# Patient Record
Sex: FEMALE | Race: OTHER | HISPANIC OR LATINO | Employment: OTHER | URBAN - METROPOLITAN AREA
[De-identification: names, ages, dates, MRNs, and addresses within clinical notes are randomized per-mention and may not be internally consistent; named-entity substitution may affect disease eponyms.]

---

## 2018-05-08 LAB
ABSOL LYMPHOCYTES (HISTORICAL): 2.5 K/UL (ref 0.5–4)
ALBUMIN SERPL BCP-MCNC: 3.8 G/DL (ref 3–5.2)
ALP SERPL-CCNC: 76 U/L (ref 43–122)
ALT SERPL W P-5'-P-CCNC: 20 U/L (ref 9–52)
ANION GAP SERPL CALCULATED.3IONS-SCNC: 10 MMOL/L (ref 5–14)
AST SERPL W P-5'-P-CCNC: 17 U/L (ref 14–36)
BANDS (HISTORICAL): 3 % (ref 3–11)
BILIRUB SERPL-MCNC: 0.3 MG/DL
BUN SERPL-MCNC: 16 MG/DL (ref 5–25)
CALCIUM SERPL-MCNC: 9.7 MG/DL (ref 8.4–10.2)
CHLORIDE SERPL-SCNC: 106 MEQ/L (ref 97–108)
CHOLEST SERPL-MCNC: 174 MG/DL
CHOLEST/HDLC SERPL: 3.5 {RATIO}
CO2 SERPL-SCNC: 25 MMOL/L (ref 22–30)
COMMENT (HISTORICAL): ABNORMAL
CREATINE, SERUM (HISTORICAL): 0.69 MG/DL (ref 0.6–1.2)
DEPRECATED RDW RBC AUTO: 14.9 %
EGFR (HISTORICAL): >60 ML/MIN/1.73 M2
EOSINOPHIL # BLD AUTO: 0.2 K/UL (ref 0–0.4)
EOSINOPHIL NFR BLD AUTO: 2 % (ref 0–6)
GLUCOSE SERPL-MCNC: 113 MG/DL (ref 70–99)
HCT VFR BLD AUTO: 31.3 % (ref 36–46)
HDLC SERPL-MCNC: 50 MG/DL
HGB BLD-MCNC: 10.3 G/DL (ref 12–16)
IRON SERPL-MCNC: 68 UG/DL (ref 37–170)
LDL/HDL RATIO (HISTORICAL): 1.9
LDLC SERPL CALC-MCNC: 94 MG/DL
LYMPHOCYTES NFR BLD AUTO: 27 % (ref 25–45)
MCH RBC QN AUTO: 30.7 PG (ref 26–34)
MCHC RBC AUTO-ENTMCNC: 32.9 % (ref 31–36)
MCV RBC AUTO: 93 FL (ref 80–100)
MONOCYTES # BLD AUTO: 1.6 K/UL (ref 0.2–0.9)
MONOCYTES NFR BLD AUTO: 17 % (ref 1–10)
NEUTROPHILS ABS COUNT (HISTORICAL): 4.9 K/UL (ref 1.8–7.8)
NEUTS SEG NFR BLD AUTO: 51 % (ref 45–65)
PERCENT SATURATION (HISTORICAL): 24 % (ref 11–46)
PLATELET # BLD AUTO: 306 K/MCL (ref 150–450)
POTASSIUM SERPL-SCNC: 4.7 MEQ/L (ref 3.6–5)
RBC # BLD AUTO: 3.36 M/MCL (ref 4–5.2)
RBC MORPHOLOGY (HISTORICAL): ABNORMAL
SODIUM SERPL-SCNC: 141 MEQ/L (ref 137–147)
T4 FREE SERPL-MCNC: 1.58 NG/DL (ref 0.78–2.19)
TIBC SERPL-MCNC: 287 UG/DL (ref 265–497)
TOTAL PROTEIN (HISTORICAL): 6.7 G/DL (ref 5.9–8.4)
TRIGL SERPL-MCNC: 152 MG/DL
TSH SERPL DL<=0.05 MIU/L-ACNC: 0.07 UIU/ML (ref 0.47–4.68)
VITAMIN D25-HYDROXY (HISTORICAL): 18.7 NG/ML (ref 30–100)
VLDLC SERPL CALC-MCNC: 30 MG/DL (ref 0–40)
WBC # BLD AUTO: 9.2 K/MCL (ref 4.5–11)

## 2018-05-09 LAB
EST. AVERAGE GLUCOSE BLD GHB EST-MCNC: 163 MG/DL
HBA1C MFR BLD HPLC: 7.3 %
HEPATITIS A IGM ANTIBODY (HISTORICAL): NORMAL
HEPATITIS B CORE IGM ANTIBODY (HISTORICAL): NORMAL
HEPATITIS B SURFACE AG CONFIRMATION (HISTORICAL): NORMAL
HEPATITIS C ANTIBODY (HISTORICAL): NORMAL

## 2018-06-26 DIAGNOSIS — Z79.4 TYPE 2 DIABETES MELLITUS WITHOUT COMPLICATION, WITH LONG-TERM CURRENT USE OF INSULIN (HCC): Primary | ICD-10-CM

## 2018-06-26 DIAGNOSIS — E11.9 TYPE 2 DIABETES MELLITUS WITHOUT COMPLICATION, WITH LONG-TERM CURRENT USE OF INSULIN (HCC): Primary | ICD-10-CM

## 2018-06-27 DIAGNOSIS — IMO0001 UNCONTROLLED DIABETES MELLITUS TYPE 2 WITHOUT COMPLICATIONS, UNSPECIFIED WHETHER LONG TERM INSULIN USE: Primary | ICD-10-CM

## 2018-06-27 RX ORDER — CARVEDILOL 3.12 MG/1
1 TABLET ORAL 2 TIMES DAILY
Refills: 3 | COMMUNITY
Start: 2018-05-18 | End: 2018-08-15 | Stop reason: ALTCHOICE

## 2018-06-27 RX ORDER — LANCETS 28 GAUGE
EACH MISCELLANEOUS
Qty: 100 EACH | Refills: 0 | Status: SHIPPED | OUTPATIENT
Start: 2018-06-27 | End: 2018-11-19 | Stop reason: SDUPTHER

## 2018-06-27 RX ORDER — BLOOD-GLUCOSE METER
1 KIT MISCELLANEOUS 2 TIMES DAILY
Qty: 1 EACH | Refills: 0 | Status: SHIPPED | OUTPATIENT
Start: 2018-06-27 | End: 2019-03-07 | Stop reason: ALTCHOICE

## 2018-06-27 RX ORDER — OMEPRAZOLE 20 MG/1
CAPSULE, DELAYED RELEASE ORAL
COMMUNITY
Start: 2018-02-13 | End: 2018-07-10 | Stop reason: SDUPTHER

## 2018-06-27 RX ORDER — SENNOSIDES 8.6 MG
CAPSULE ORAL
COMMUNITY
Start: 2017-05-24 | End: 2019-08-29 | Stop reason: ALTCHOICE

## 2018-06-27 RX ORDER — VALSARTAN 160 MG/1
1 TABLET ORAL DAILY
Refills: 5 | COMMUNITY
Start: 2018-05-21 | End: 2018-08-15 | Stop reason: ALTCHOICE

## 2018-07-10 ENCOUNTER — OFFICE VISIT (OUTPATIENT)
Dept: FAMILY MEDICINE CLINIC | Facility: CLINIC | Age: 83
End: 2018-07-10
Payer: MEDICARE

## 2018-07-10 VITALS
HEART RATE: 82 BPM | TEMPERATURE: 98 F | WEIGHT: 174 LBS | HEIGHT: 63 IN | RESPIRATION RATE: 16 BRPM | SYSTOLIC BLOOD PRESSURE: 130 MMHG | OXYGEN SATURATION: 97 % | BODY MASS INDEX: 30.83 KG/M2 | DIASTOLIC BLOOD PRESSURE: 60 MMHG

## 2018-07-10 DIAGNOSIS — M54.42 CHRONIC LEFT-SIDED LOW BACK PAIN WITH LEFT-SIDED SCIATICA: ICD-10-CM

## 2018-07-10 DIAGNOSIS — E05.00 THYROTOXICOSIS WITH DIFFUSE GOITER AND WITHOUT THYROID STORM: ICD-10-CM

## 2018-07-10 DIAGNOSIS — E11.9 TYPE 2 DIABETES MELLITUS WITHOUT COMPLICATION, WITHOUT LONG-TERM CURRENT USE OF INSULIN (HCC): ICD-10-CM

## 2018-07-10 DIAGNOSIS — G89.29 CHRONIC LEFT-SIDED LOW BACK PAIN WITH LEFT-SIDED SCIATICA: ICD-10-CM

## 2018-07-10 DIAGNOSIS — K29.30 CHRONIC SUPERFICIAL GASTRITIS WITHOUT BLEEDING: Primary | ICD-10-CM

## 2018-07-10 PROCEDURE — 99214 OFFICE O/P EST MOD 30 MIN: CPT | Performed by: FAMILY MEDICINE

## 2018-07-10 RX ORDER — OMEPRAZOLE 20 MG/1
20 CAPSULE, DELAYED RELEASE ORAL DAILY
Qty: 30 CAPSULE | Refills: 2 | Status: SHIPPED | OUTPATIENT
Start: 2018-07-10 | End: 2018-08-15 | Stop reason: ALTCHOICE

## 2018-07-10 RX ORDER — METHIMAZOLE 5 MG/1
5 TABLET ORAL 3 TIMES DAILY
Qty: 30 TABLET | Refills: 5 | Status: SHIPPED | OUTPATIENT
Start: 2018-07-10 | End: 2018-11-09 | Stop reason: SDUPTHER

## 2018-07-10 RX ORDER — LIDOCAINE 50 MG/G
OINTMENT TOPICAL AS NEEDED
Qty: 240 G | Refills: 3 | Status: SHIPPED | OUTPATIENT
Start: 2018-07-10 | End: 2019-03-07 | Stop reason: ALTCHOICE

## 2018-07-10 RX ORDER — BLOOD-GLUCOSE METER
KIT MISCELLANEOUS
Refills: 0 | COMMUNITY
Start: 2018-06-27 | End: 2019-03-07 | Stop reason: ALTCHOICE

## 2018-07-10 NOTE — PROGRESS NOTES
Assessment/Plan:    Diabetes mellitus ([facility] [address] )  Lab Results   Component Value Date    HGBA1C 7 3 (H) 05/08/2018       Blood sugars in good control will continue same  Thyrotoxicosis  She has hyperthyroidism, I asked her to start methimazole 5 mg once a day, repeat the labs in three months       Diagnoses and all orders for this visit:    Chronic superficial gastritis without bleeding  -     omeprazole (PriLOSEC) 20 mg delayed release capsule; Take 1 capsule (20 mg total) by mouth daily for 30 days    Chronic left-sided low back pain with left-sided sciatica  -     lidocaine (XYLOCAINE) 5 % ointment; Apply topically as needed for mild pain    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  -     methimazole (TAPAZOLE) 5 mg tablet; Take 1 tablet (5 mg total) by mouth 3 (three) times a day for 30 days    Type 2 diabetes mellitus without complication, without long-term current use of insulin ([facility])    Other orders  -     Blood Glucose Monitoring Suppl (FREESTYLE LITE) MARVIN; USE BID          Subjective:      Patient ID: Kelly Chand is a 80 y o  female  Pt c/o increased left lower back pain  The last two labs of TSH were in the hyperthyroidism range  Abdominal Pain   Pertinent negatives include no fever, headaches, hematuria, nausea or vomiting  Hypertension   Pertinent negatives include no chest pain, headaches, palpitations or shortness of breath  Back Pain   Associated symptoms include abdominal pain  Pertinent negatives include no chest pain, fever or headaches  The following portions of the patient's history were reviewed and updated as appropriate: allergies, current medications, past family history, past medical history, past social history, past surgical history and problem list     Review of Systems   Constitutional: Positive for unexpected weight change  Negative for fatigue and fever  HENT: Negative for sore throat and trouble swallowing      Eyes: Negative for photophobia  Respiratory: Negative for cough, chest tightness, shortness of breath and wheezing  Cardiovascular: Negative for chest pain, palpitations and leg swelling  Gastrointestinal: Positive for abdominal pain  Negative for blood in stool, nausea and vomiting  Genitourinary: Negative for hematuria  Musculoskeletal: Positive for back pain (Pain is in the left sacroiliac joint)  Neurological: Negative for dizziness, syncope, light-headedness and headaches  Hematological: Does not bruise/bleed easily  Objective:      /60 (BP Location: Left arm, Patient Position: Sitting, Cuff Size: Standard)   Pulse 82   Temp 98 °F (36 7 °C) (Oral)   Resp 16   Ht 5' 3" (1 6 m)   Wt 78 9 kg (174 lb)   SpO2 97%   Breastfeeding? No   BMI 30 82 kg/m²          Physical Exam   Constitutional: She is oriented to person, place, and time  She appears well-developed and well-nourished  HENT:   Head: Normocephalic  Eyes: Pupils are equal, round, and reactive to light  Neck: Normal range of motion  Cardiovascular: Normal rate and regular rhythm  Pulmonary/Chest: Effort normal and breath sounds normal    Abdominal: Soft  Bowel sounds are normal    Musculoskeletal: She exhibits tenderness ( Tenderness is in the left sacroiliacjoint)  Neurological: She is alert and oriented to person, place, and time  She has normal reflexes  Skin: Skin is warm and dry  Psychiatric: She has a normal mood and affect   Her behavior is normal  Judgment and thought content normal

## 2018-07-10 NOTE — ASSESSMENT & PLAN NOTE
Lab Results   Component Value Date    HGBA1C 7 3 (H) 05/08/2018       Blood sugars in good control will continue same

## 2018-07-10 NOTE — ASSESSMENT & PLAN NOTE
She has hyperthyroidism, I asked her to start methimazole 5 mg once a day, repeat the labs in three months

## 2018-07-20 ENCOUNTER — DOCUMENTATION (OUTPATIENT)
Dept: FAMILY MEDICINE CLINIC | Facility: CLINIC | Age: 83
End: 2018-07-20

## 2018-07-20 ENCOUNTER — TELEPHONE (OUTPATIENT)
Dept: FAMILY MEDICINE CLINIC | Facility: CLINIC | Age: 83
End: 2018-07-20

## 2018-07-20 DIAGNOSIS — I10 ESSENTIAL HYPERTENSION, BENIGN: Primary | ICD-10-CM

## 2018-07-20 RX ORDER — LOSARTAN POTASSIUM 50 MG/1
25 TABLET ORAL DAILY
Qty: 30 TABLET | Refills: 2 | Status: SHIPPED | OUTPATIENT
Start: 2018-07-20 | End: 2018-08-22 | Stop reason: SDUPTHER

## 2018-07-20 NOTE — TELEPHONE ENCOUNTER
Pt called stating that pharmacy called her about valsartan  She needs a replacement for this medication  [facility] to [facility]  [address]

## 2018-08-06 ENCOUNTER — TELEPHONE (OUTPATIENT)
Dept: FAMILY MEDICINE CLINIC | Facility: CLINIC | Age: 83
End: 2018-08-06

## 2018-08-06 NOTE — TELEPHONE ENCOUNTER
Flower from [facility] called stating that patient is having knee pain and she will like to know if Dr Carrie Sousa can send a script for Lidocaine patches for the patient  She states that patient is using OTC cream and it is not helping

## 2018-08-08 DIAGNOSIS — M79.604 PAIN IN BOTH LOWER EXTREMITIES: Primary | ICD-10-CM

## 2018-08-08 DIAGNOSIS — M79.605 PAIN IN BOTH LOWER EXTREMITIES: Primary | ICD-10-CM

## 2018-08-08 RX ORDER — LIDOCAINE 50 MG/G
1 PATCH TOPICAL DAILY
Qty: 30 PATCH | Refills: 0 | Status: SHIPPED | OUTPATIENT
Start: 2018-08-08 | End: 2018-09-01 | Stop reason: SDUPTHER

## 2018-08-15 ENCOUNTER — OFFICE VISIT (OUTPATIENT)
Dept: FAMILY MEDICINE CLINIC | Facility: CLINIC | Age: 83
End: 2018-08-15
Payer: MEDICARE

## 2018-08-15 VITALS
HEART RATE: 80 BPM | BODY MASS INDEX: 30.48 KG/M2 | DIASTOLIC BLOOD PRESSURE: 60 MMHG | OXYGEN SATURATION: 97 % | RESPIRATION RATE: 16 BRPM | SYSTOLIC BLOOD PRESSURE: 120 MMHG | TEMPERATURE: 98.1 F | HEIGHT: 63 IN | WEIGHT: 172 LBS

## 2018-08-15 DIAGNOSIS — R00.1 BRADYCARDIA: ICD-10-CM

## 2018-08-15 DIAGNOSIS — I10 BENIGN ESSENTIAL HYPERTENSION: Primary | ICD-10-CM

## 2018-08-15 PROCEDURE — 99213 OFFICE O/P EST LOW 20 MIN: CPT | Performed by: FAMILY MEDICINE

## 2018-08-15 NOTE — PROGRESS NOTES
Assessment/Plan:    Benign essential hypertension  Patient is here for physical exam we find this visit without any distress or abnormalities  We  recommended exercise at least three and 0 5 hours per week and healthy diet with a low carbohydrate and low saturated fat  Began to follow him up in one year for his regular physical exam      Bradycardia  His bradycardia was secondary to carvedilol, I request patient to stop this medication  The risk of getting complete block are age is high, and the blood pressure is under control  She does not have cardiac issues  Diagnoses and all orders for this visit:    Benign essential hypertension    Bradycardia          Subjective:      Patient ID: Marisela Geiger is a 80 y o  female  PT here post home nurse visit  Home nurse at home on Monday, found patient's heart rate to be 46  Pt asymptomatic  Pt called to office on Monday but pt did not want to come until today  The following portions of the patient's history were reviewed and updated as appropriate: allergies, current medications, past family history, past medical history, past social history, past surgical history and problem list     Review of Systems   Constitutional: Positive for unexpected weight change  Negative for fatigue and fever  HENT: Negative for sore throat and trouble swallowing  Eyes: Negative for photophobia  Respiratory: Negative for cough, chest tightness, shortness of breath and wheezing  Cardiovascular: Negative for chest pain, palpitations and leg swelling  Gastrointestinal: Negative for abdominal pain, blood in stool, nausea and vomiting  Genitourinary: Negative for hematuria  Neurological: Negative for dizziness, syncope, light-headedness and headaches  Hematological: Does not bruise/bleed easily           Objective:      /60 (BP Location: Left arm, Patient Position: Sitting, Cuff Size: Standard)   Pulse 80   Temp 98 1 °F (36 7 °C) (Oral)   Resp 16 Ht 5' 3" (1 6 m)   Wt 78 kg (172 lb)   SpO2 97%   Breastfeeding? No   BMI 30 47 kg/m²          Physical Exam   Constitutional: She is oriented to person, place, and time  She appears well-developed and well-nourished  Obese looking  HENT:   Head: Normocephalic  Eyes: EOM are normal  Pupils are equal, round, and reactive to light  Neck: Neck supple  Cardiovascular: Normal rate and regular rhythm  Pulmonary/Chest: Effort normal and breath sounds normal    Abdominal: Soft  Bowel sounds are normal    Musculoskeletal: Normal range of motion  She exhibits tenderness  Neurological: She is alert and oriented to person, place, and time  She has normal reflexes  Skin: Skin is warm and dry  Psychiatric: She has a normal mood and affect   Her behavior is normal  Judgment and thought content normal

## 2018-08-15 NOTE — PATIENT INSTRUCTIONS
Bradicardia   LO QUE NECESITA SABER:  Esta relacionado con el uso de Carvedilol  La bradicardia es mulu frecuencia cardíaca de menos de 60 latidos por minuto  Un frecuencia cardíaca lenta es normal para [employer], lynette los deportistas, y no necesita tratamiento  La bradicardia también podría ser causada por afecciones de david que necesitan tratamiento  MIENTRAS USTED ESTÁ AQUÍ:   Consentimiento informado  es un documento legal que explica los exámenes, tratamientos, o procedimientos que usted podría necesitar  Un consentimiento informado significa que usted comprende que es lo que se va a realizar y que puede ana decisiones sobre lo que usted desee  Usted da groves permiso al firmar el formulario de consentimiento  Puede designar a otra persona para que firme patricia formulario por usted si usted no puede hacerlo  Usted tiene el derecho de comprender groves cuidado médico en términos o palabras que usted pueda entender  Antes de firmar el documento de consentimiento, entienda los riesgos y beneficios de lo que se le hará  Asegúrese que todas saurav preguntas anne-marie contestadas  Naaman  intravenosa  es un tubo pequeño que se coloca en la vena y se [place] para administrar medicamentos o líquidos  Oxígeno:  Es posible que necesite oxígeno adicional si el nivel de oxígeno en groves rick se encuentra por debajo de donde debería estar  Es posible que le administren oxígeno a través de mulu máscarilla colocada sobre la nariz y la boca o a través de pequeños tubos colocados en las fosas nasales  Medicamentos:   · Los medicamentos para el corazón  Se pueden administrar para aumentar la frecuencia cardíaca  Estos medicamentos pueden administrarse mediante mulu vía intravenosa  · Otros medicamentos  se pueden administrar para cualquier afección médica que le está causando groves bradicardia  Estos problemas de david pueden incluir mulu infección, o un desequilibrio en groves nivel de azúcar en la rick, electrolítico o de la tiroides  Hable con groves médico sobre [employer]  Exámenes:   · Los análisis de rick:  se pueden realizar para determinar si tiene algún daño al corazón  También se pueden utilizar para encontrar la causa de groves bradicardia  · Un electrocardiograma  registra la actividad del corazón  Se puede utilizar para comprobar el daño al corazón debido a un ataque al corazón o por problemas con la forma lynette los signos Marcelina Mitten a través de groves corazón  Monitoreo:   · Signos vitales  incluye la revisión de la frecuencia cardíaca, la presión arterial, la frecuencia respiratoria y la temperatura corporal  Saurav signos vitales serán revisados varias veces mientras usted esté en el hospital      · Un oxímetro de pulso  revisa el nivel de oxígeno en groves rick  Es posible que grvoes cuerpo no reciba suficiente oxígeno si groves corazón bombea demasiado lento  Un cable con mulu capucha o gancho al final o mulu cinta adhesiva será colocado en groves dedo, oreja o dedo cate del pie  La otra extremidad del cordón se sujeta a la máquina  · Un monitor cardíaco se le conoce lynette telemetría, se utilizará para registrar groves frecuencia y ritmo cardíaco mientras esté en el hospital   [facility] [place]:   · Un marcapasos temporal  es un tratamiento a corto plazo en el hospital  El marcapasos se adhiere a groves piel con conductores adhesivos o es insertado en el interior de mulu vena de groves carson o pecho  Un pequeño dispositivo de estimulación ayuda a mantener estables saurav latidos cardíacos  · Un marcapaso permanente  se implanta quirúrgicamente debajo de groves piel en groves pecho o abdomen  Minor Matters diminuta genera unos impulsos eléctricos que mantienen groves frecuencia cardíaca regular  RIESGOS:   Usted puede desmayarse cuando groves frecuencia cardíaca se le baja demasiado  Usted podría presentar convulsiones, presión arterial dereck, dolor en el pecho o insuficiencia cardíaca  Groves corazón podría dejar de latir     ACUERDOS SOBRE GROVES CUIDADO:   Usted tiene el derecho de ayudar a planear fletcher cuidado  Aprenda todo lo que pueda sobre fletcher condición y lynette darle tratamiento  Discuta saurav opciones de tratamiento con saurav médicos para decidir el cuidado que usted desea recibir  Usted siempre tiene el derecho de rechazar el tratamiento  © 2017 [address] Information is for End User's use only and may not be sold, redistributed or otherwise used for commercial purposes  All illustrations and images included in CareNotes® are the copyrighted property of A D A M , Inc  or Man Kidd  Esta información es sólo para uso en educación  Fletcher intención no es darle un consejo médico sobre enfermedades o tratamientos  Colsulte con fletcher Star Selina farmacéutico antes de seguir cualquier régimen médico para saber si es seguro y efectivo para usted

## 2018-08-16 PROBLEM — R00.1 BRADYCARDIA: Status: ACTIVE | Noted: 2018-08-16

## 2018-08-16 NOTE — ASSESSMENT & PLAN NOTE
His bradycardia was secondary to carvedilol, I request patient to stop this medication  The risk of getting complete block are age is high, and the blood pressure is under control  She does not have cardiac issues

## 2018-08-16 NOTE — ASSESSMENT & PLAN NOTE
Patient is here for physical exam we find this visit without any distress or abnormalities  We  recommended exercise at least three and 0 5 hours per week and healthy diet with a low carbohydrate and low saturated fat    Began to follow him up in one year for his regular physical exam

## 2018-08-22 DIAGNOSIS — I10 ESSENTIAL HYPERTENSION, BENIGN: ICD-10-CM

## 2018-08-22 RX ORDER — LOSARTAN POTASSIUM 50 MG/1
50 TABLET ORAL DAILY
Qty: 30 TABLET | Refills: 5 | Status: SHIPPED | OUTPATIENT
Start: 2018-08-22 | End: 2018-09-21 | Stop reason: SDUPTHER

## 2018-08-29 DIAGNOSIS — M79.605 PAIN IN BOTH LOWER EXTREMITIES: ICD-10-CM

## 2018-08-29 DIAGNOSIS — M79.604 PAIN IN BOTH LOWER EXTREMITIES: ICD-10-CM

## 2018-08-29 NOTE — TELEPHONE ENCOUNTER
Visiting nurse called stating the patient has been using 1 on each knee daily  Would like the script to be sent to the pharmacy with the directions specifying that

## 2018-09-01 RX ORDER — LIDOCAINE 50 MG/G
PATCH TOPICAL
Qty: 60 PATCH | Refills: 0 | Status: SHIPPED | OUTPATIENT
Start: 2018-09-01 | End: 2019-03-07 | Stop reason: ALTCHOICE

## 2018-09-21 DIAGNOSIS — I10 ESSENTIAL HYPERTENSION, BENIGN: ICD-10-CM

## 2018-09-24 RX ORDER — LOSARTAN POTASSIUM 50 MG/1
TABLET ORAL
Qty: 90 TABLET | Refills: 5 | Status: SHIPPED | OUTPATIENT
Start: 2018-09-24 | End: 2019-02-25 | Stop reason: SDUPTHER

## 2018-10-11 DIAGNOSIS — R10.13 EPIGASTRIC PAIN: Primary | ICD-10-CM

## 2018-10-11 RX ORDER — OMEPRAZOLE 20 MG/1
20 CAPSULE, DELAYED RELEASE ORAL DAILY
Qty: 30 CAPSULE | Refills: 2 | OUTPATIENT
Start: 2018-10-11

## 2018-10-11 RX ORDER — FAMOTIDINE 40 MG/1
40 TABLET, FILM COATED ORAL 2 TIMES DAILY PRN
Qty: 60 TABLET | Refills: 5 | Status: SHIPPED | OUTPATIENT
Start: 2018-10-11 | End: 2018-11-15 | Stop reason: ALTCHOICE

## 2018-11-09 RX ORDER — METHIMAZOLE 5 MG/1
TABLET ORAL
Refills: 5 | COMMUNITY
Start: 2018-10-07 | End: 2019-03-07 | Stop reason: ALTCHOICE

## 2018-11-09 RX ORDER — OMEPRAZOLE 20 MG/1
CAPSULE, DELAYED RELEASE ORAL
Refills: 0 | COMMUNITY
Start: 2018-09-03 | End: 2018-11-15 | Stop reason: SDUPTHER

## 2018-11-15 ENCOUNTER — OFFICE VISIT (OUTPATIENT)
Dept: FAMILY MEDICINE CLINIC | Facility: CLINIC | Age: 83
End: 2018-11-15
Payer: MEDICARE

## 2018-11-15 ENCOUNTER — APPOINTMENT (OUTPATIENT)
Dept: LAB | Facility: HOSPITAL | Age: 83
End: 2018-11-15
Payer: MEDICARE

## 2018-11-15 VITALS
HEIGHT: 63 IN | TEMPERATURE: 98 F | DIASTOLIC BLOOD PRESSURE: 70 MMHG | SYSTOLIC BLOOD PRESSURE: 150 MMHG | OXYGEN SATURATION: 98 % | HEART RATE: 92 BPM | WEIGHT: 173 LBS | BODY MASS INDEX: 30.65 KG/M2 | RESPIRATION RATE: 16 BRPM

## 2018-11-15 DIAGNOSIS — E11.9 TYPE 2 DIABETES MELLITUS WITHOUT COMPLICATION, WITHOUT LONG-TERM CURRENT USE OF INSULIN (HCC): Primary | ICD-10-CM

## 2018-11-15 DIAGNOSIS — K29.30 SUPERFICIAL GASTRITIS WITHOUT HEMORRHAGE, UNSPECIFIED CHRONICITY: ICD-10-CM

## 2018-11-15 DIAGNOSIS — I10 BENIGN ESSENTIAL HYPERTENSION: ICD-10-CM

## 2018-11-15 DIAGNOSIS — E03.9 HYPOTHYROIDISM (ACQUIRED): Primary | ICD-10-CM

## 2018-11-15 DIAGNOSIS — E11.9 TYPE 2 DIABETES MELLITUS WITHOUT COMPLICATION, WITHOUT LONG-TERM CURRENT USE OF INSULIN (HCC): ICD-10-CM

## 2018-11-15 DIAGNOSIS — E03.9 HYPOTHYROIDISM (ACQUIRED): ICD-10-CM

## 2018-11-15 DIAGNOSIS — Z23 NEEDS FLU SHOT: ICD-10-CM

## 2018-11-15 LAB
ALBUMIN SERPL BCP-MCNC: 4.2 G/DL (ref 3–5.2)
ALP SERPL-CCNC: 72 U/L (ref 43–122)
ALT SERPL W P-5'-P-CCNC: 14 U/L (ref 9–52)
ANION GAP SERPL CALCULATED.3IONS-SCNC: 8 MMOL/L (ref 5–14)
ANISOCYTOSIS BLD QL SMEAR: PRESENT
AST SERPL W P-5'-P-CCNC: 20 U/L (ref 14–36)
BILIRUB SERPL-MCNC: 0.3 MG/DL
BUN SERPL-MCNC: 20 MG/DL (ref 5–25)
CALCIUM SERPL-MCNC: 9.5 MG/DL (ref 8.4–10.2)
CHLORIDE SERPL-SCNC: 105 MMOL/L (ref 97–108)
CHOLEST SERPL-MCNC: 148 MG/DL
CO2 SERPL-SCNC: 28 MMOL/L (ref 22–30)
CREAT SERPL-MCNC: 0.7 MG/DL (ref 0.6–1.2)
EOSINOPHIL # BLD AUTO: 0.05 THOUSAND/UL (ref 0–0.4)
EOSINOPHIL NFR BLD MANUAL: 1 % (ref 0–6)
ERYTHROCYTE [DISTWIDTH] IN BLOOD BY AUTOMATED COUNT: 15 %
GFR SERPL CREATININE-BSD FRML MDRD: 77 ML/MIN/1.73SQ M
GLUCOSE SERPL-MCNC: 166 MG/DL (ref 70–99)
HCT VFR BLD AUTO: 31.1 % (ref 36–46)
HDLC SERPL-MCNC: 53 MG/DL (ref 40–59)
HGB BLD-MCNC: 10.4 G/DL (ref 12–16)
HYPERCHROMIA BLD QL SMEAR: PRESENT
LDLC SERPL CALC-MCNC: 69 MG/DL
LYMPHOCYTES # BLD AUTO: 1.46 THOUSAND/UL (ref 0.5–4)
LYMPHOCYTES # BLD AUTO: 27 % (ref 20–50)
MCH RBC QN AUTO: 30.6 PG (ref 26.8–34.3)
MCHC RBC AUTO-ENTMCNC: 33.3 G/DL (ref 31.4–37.4)
MCV RBC AUTO: 92 FL (ref 82–98)
MONOCYTES # BLD AUTO: 0.97 THOUSAND/UL (ref 0.2–0.9)
MONOCYTES NFR BLD AUTO: 18 % (ref 1–10)
NEUTS BAND NFR BLD MANUAL: 2 % (ref 0–8)
NEUTS SEG # BLD: 2.92 THOUSAND/UL (ref 1.8–7.8)
NEUTS SEG NFR BLD AUTO: 52 %
PLATELET # BLD AUTO: 242 THOUSANDS/UL (ref 150–450)
PLATELET BLD QL SMEAR: ADEQUATE
PMV BLD AUTO: 9.1 FL (ref 8.9–12.7)
POIKILOCYTOSIS BLD QL SMEAR: PRESENT
POTASSIUM SERPL-SCNC: 4 MMOL/L (ref 3.6–5)
PROT SERPL-MCNC: 7.2 G/DL (ref 5.9–8.4)
RBC # BLD AUTO: 3.38 MILLION/UL (ref 4–5.2)
RBC MORPH BLD: ABNORMAL
SODIUM SERPL-SCNC: 141 MMOL/L (ref 137–147)
TOTAL CELLS COUNTED SPEC: 100
TRIGL SERPL-MCNC: 130 MG/DL
TSH SERPL DL<=0.05 MIU/L-ACNC: 1.29 UIU/ML (ref 0.47–4.68)
WBC # BLD AUTO: 5.4 THOUSAND/UL (ref 4.31–10.16)

## 2018-11-15 PROCEDURE — 99214 OFFICE O/P EST MOD 30 MIN: CPT | Performed by: FAMILY MEDICINE

## 2018-11-15 PROCEDURE — 80053 COMPREHEN METABOLIC PANEL: CPT

## 2018-11-15 PROCEDURE — 36415 COLL VENOUS BLD VENIPUNCTURE: CPT

## 2018-11-15 PROCEDURE — 80061 LIPID PANEL: CPT

## 2018-11-15 PROCEDURE — 84443 ASSAY THYROID STIM HORMONE: CPT

## 2018-11-15 PROCEDURE — 82043 UR ALBUMIN QUANTITATIVE: CPT | Performed by: FAMILY MEDICINE

## 2018-11-15 PROCEDURE — G0008 ADMIN INFLUENZA VIRUS VAC: HCPCS | Performed by: FAMILY MEDICINE

## 2018-11-15 PROCEDURE — 85007 BL SMEAR W/DIFF WBC COUNT: CPT

## 2018-11-15 PROCEDURE — 83036 HEMOGLOBIN GLYCOSYLATED A1C: CPT

## 2018-11-15 PROCEDURE — 85027 COMPLETE CBC AUTOMATED: CPT

## 2018-11-15 PROCEDURE — 82570 ASSAY OF URINE CREATININE: CPT | Performed by: FAMILY MEDICINE

## 2018-11-15 PROCEDURE — 90662 IIV NO PRSV INCREASED AG IM: CPT | Performed by: FAMILY MEDICINE

## 2018-11-15 RX ORDER — OMEPRAZOLE 20 MG/1
20 CAPSULE, DELAYED RELEASE ORAL DAILY
Qty: 30 CAPSULE | Refills: 5 | Status: SHIPPED | OUTPATIENT
Start: 2018-11-15 | End: 2019-03-07 | Stop reason: ALTCHOICE

## 2018-11-15 NOTE — PATIENT INSTRUCTIONS
Hipertensión crónica   CUIDADO AMBULATORIO:   Hipertensión  es la presión arterial dereck  La presión arterial es la fuerza que ejerce la rick contra las wolfe de las arterias  La presión arterial normal debería estar a menos de 120/80  La pre-hipertensión estaría entre 120/80 y 139/ 80  La presión arterial dereck estaría a 140/90 o más dereck  La hipertensión causa que groves presión arterial se eleve tanto que groves corazón se ve forzado a trabajar [employer] de lo normal  [place] puede dañar groves corazón  La hipertensión crónica es mulu condición de nati plazo que usted puede controlar con un estilo de meera theo o con medicamentos  La presión [place] a proteger saurav órganos lynette groves corazón, pulmones, cerebro, y riñones  Los síntomas más comunes incluyen los siguientes:   · Dolor de kulwant     · Visión borrosa    · Dolor de pecho     · Mareos o debilidad     · Dificultad para respirar     · Hemorragias nasales (sangrado de la Dairl Lux)  Llame al 911 en reuben de presentar lo siguiente:   · Usted tiene malestar en el pecho que se siente lynette estrujamiento, presión, Lema Muck o dolor  · Usted se siente confundido o tiene dificultad para hablar  · Repentinamente se siente aturdido o con dificultad para respirar  · Usted tiene dolor o [employer] espalda, [place], Evy, abdomen o Viviane Shallow  Busque atención médica de inmediato si:   · Usted tiene un monica dolor de kulwant o pérdida de la visión  · Usted tiene debilidad en un brazo o en mulu pierna  Pregúntele a groves [place]indy [place] vitaminas y minerales son adecuados para usted  · Usted se siente mareado, confundido, somnoliento o lynette si se fuera a desmayar  · Usted se ha tomado groves medicamento para la presión arterial patty groves presión arterial todavía está más dereck de lo que le indicó groves médico     · Usted tiene preguntas o inquietudes acerca de groves condición o cuidado    El tratamiento para la hipertensión crónica  puede incluir medicamentos para bajar la presión arterial y reducir el nivel de colesterol  Un nivel bajo de colesterol ayuda a prevenir enfermedades cardíacas y facilita el control de la presión arterial  La enfermedad cardíaca puede dificultar el control de la presión arterial  Es probable que usted también necesite hacer algunos cambios en groves estilo de merea  Tómese saurav medicamentos exactamente lynette se lo indicaron  Controle la hipertensión crónica:  Hable con groves médico sobre las siguientes recomendaciones y otras formas de controlar la hipertensión:  · Tómese la presión arterial en groves casa  Siéntese y descanse por 5 minutos antes de tomarse la presión arterial  Extienda groves brazo y apóyelo en mulu superficie plana  Groves brazo debe estar a la misma altura que groves corazón  Siga las instrucciones que vienen con el monitor para la presión arterial o tensiómetro  Si es posible tome por lo menos 2 lecturas de la presión cada vez  Tómese la presión arterial por lo [facility] al día a la misma hora todos los días, mulu en la mañana y la otra en la noche  Mantenga un registro de las lecturas de groves presión arterial y llévelo consigo a saurav consultas  Pregúntele a groves médico cuál debería ser groves presión arterial            · Limite el sodio (la sal) lynette se le haya indicado  Demasiado sodio puede afectar el equilibrio de líquidos  Revise las etiquetas para buscar alimentos bajos en sodio o sin sal agregada  Algunos alimentos bajos en sodio utilizan sales de potasio para añadir sabor  Demasiado potasio también puede causar problemas de [place]  Groves médico le dirá qué cantidad de sodio y potasio es patel para el consumo en un día  Él puede recomendarle que limite el sodio a 2,300 mg al día  · Siga el plan de comidas recomendado por groves médico   Un dietista o médico puede darle más información sobre planes de bajo contenido de sodio o el plan de alimentación DASH (enfoques dietéticos para detener la hipertensión)   El plan DASH es bajo en sodio, grasas saturadas y grasa total  Es alto en potasio, calcio y [place]  · Ejercítese para mantener un peso saludable  Realice actividad física por lo menos 30 minutos al día, la mayoría de los días de la [place]  [place] ayudará a bajar fletcher presión arterial  Pida más información acerca de un plan de ejercicio adecuado para usted  · [address] [facility] estr[address]  [place] podría ayudarlo a bajar fletcher presión arterial  Aprenda sobre formas de relajarse, lynette respiración profunda o escuchar música  · Limite el consumo de alcohol  Las mujeres deberían limitar el consumo de alcohol a 1 bebida por día  Los hombres deberían limitar el consumo de alcohol a 2 tragos al día  Un trago equivale a 12 onzas de cerveza, 5 onzas de vino o 1 onza y ½ de licor  · No fume  La nicotina y otros químicos en los cigarrillos y cigarros pueden aumentar fletcher presión arterial y también pueden provocar daño al pulmón  Pida información a fletcher médico si usted actualmente fuma y necesita ayuda para dejar de fumar  Los cigarrillos electrónicos o tabaco sin humo todavía contienen nicotina  Consulte con fletcher médico antes de [employer]  Acuda a saurav consultas de control con fletcher médico según le indicaron  Usted tendrá que regresar para que le revisen la presión arterial y para que le onel otras pruebas de laboratorio  Anote saurav preguntas para que se acuerde de hacerlas ariadne saurav visitas  © 2017 [address] Information is for End User's use only and may not be sold, redistributed or otherwise used for commercial purposes  All illustrations and images included in CareNotes® are the copyrighted property of A D A M , Inc  or Man Kidd  Esta información es sólo para uso en educación  Fletcher intención no es darle un consejo médico sobre enfermedades o tratamientos  Colsulte con fletcher Bren Shouts farmacéutico antes de seguir cualquier régimen médico para saber si es seguro y efectivo para usted

## 2018-11-15 NOTE — PROGRESS NOTES
Assessment/Plan:  1  Needs flu shot    - influenza vaccine, 3040-5554, high-dose, PF 0 5 mL, for patients 65 yr+ (FLUZONE HIGH-DOSE)    2  Type 2 diabetes mellitus without complication, without long-term current use of insulin (HCC)  I explained to patient the goals of blood sugar levels during fasting  and after meals  Education given verbally and with written materials  Change of life style modification again stressed  The vascular complications secondary to diabetes poor control were explained with details  The renal function protection and the prevention of major vascular disease with the use of ACEI's and/or ARB  and the use of statins respectively and exercise/diet was also discussed  - Hemoglobin A1C; Future  - Microalbumin / creatinine urine ratio  - glucose blood test strip; 1 each by Other route 2 (two) times a day Use as instructed  Dispense: 100 each; Refill: 3    3  Benign essential hypertension  Condition is stable with current treatment, to  continue the same    - CBC and differential; Future  - Comprehensive metabolic panel; Future  - Lipid Panel with Direct LDL reflex; Future    4  Superficial gastritis without hemorrhage, unspecified chronicity  Famotidine given in the past that did not work, patient will continue with a PPI treatment despite against this practice  - omeprazole (PriLOSEC) 20 mg delayed release capsule; Take 1 capsule (20 mg total) by mouth daily  Dispense: 30 capsule; Refill: 5    No problem-specific Assessment & Plan notes found for this encounter  Diagnoses and all orders for this visit:    Needs flu shot  -     influenza vaccine, [id number], high-dose, PF 0 5 mL, for patients 65 yr+ (FLUZONE HIGH-DOSE)          Subjective:      Patient ID: Indira Almeida is a 80 y o  female      HPI    The following portions of the patient's history were reviewed and updated as appropriate: allergies, current medications, past family history, past medical history, past social history, past surgical history and problem list     Review of Systems   Constitutional: Negative for fatigue, fever and unexpected weight change  HENT: Negative for sore throat and trouble swallowing  Eyes: Negative for photophobia  Respiratory: Negative for cough, chest tightness, shortness of breath and wheezing  Cardiovascular: Negative for chest pain, palpitations and leg swelling  Gastrointestinal: Negative for abdominal pain, blood in stool, nausea and vomiting  Genitourinary: Negative for hematuria  Musculoskeletal: Positive for arthralgias ( painful knees, she wants to continue with same treatment of using Tylenol) and gait problem (Due to severe osteoarthritis of both knees)  Neurological: Negative for dizziness, syncope, light-headedness and headaches  Hematological: Does not bruise/bleed easily  Objective:      /70 (BP Location: Left arm, Patient Position: Sitting, Cuff Size: Standard)   Pulse 92   Temp 98 °F (36 7 °C) (Oral)   Resp 16   Ht 5' 3" (1 6 m)   Wt 78 5 kg (173 lb)   SpO2 98%   Breastfeeding? No   BMI 30 65 kg/m²          Physical Exam   HENT:   Head: Normocephalic  Eyes: Pupils are equal, round, and reactive to light  EOM are normal    Neck: Neck supple  Cardiovascular: Normal rate and regular rhythm  Pulmonary/Chest: Effort normal    Abdominal: Soft  Musculoskeletal: Normal range of motion  She exhibits tenderness (The formation of knee joints with valgus gait  She uses a cane to walk but she also needs assistance from other person)  Neurological: She is alert  Skin: Skin is warm and dry  Psychiatric: She has a normal mood and affect   Her behavior is normal

## 2018-11-16 LAB
CREAT UR-MCNC: 133 MG/DL
EST. AVERAGE GLUCOSE BLD GHB EST-MCNC: 134 MG/DL
HBA1C MFR BLD: 6.3 % (ref 4.2–6.3)
MICROALBUMIN UR-MCNC: 85.7 MG/L (ref 0–20)
MICROALBUMIN/CREAT 24H UR: 64 MG/G CREATININE (ref 0–30)

## 2018-11-19 DIAGNOSIS — Z79.4 TYPE 2 DIABETES MELLITUS WITHOUT COMPLICATION, WITH LONG-TERM CURRENT USE OF INSULIN (HCC): ICD-10-CM

## 2018-11-19 DIAGNOSIS — E11.9 TYPE 2 DIABETES MELLITUS WITHOUT COMPLICATION, WITH LONG-TERM CURRENT USE OF INSULIN (HCC): ICD-10-CM

## 2018-11-21 ENCOUNTER — TELEPHONE (OUTPATIENT)
Dept: FAMILY MEDICINE CLINIC | Facility: CLINIC | Age: 83
End: 2018-11-21

## 2018-11-21 DIAGNOSIS — D64.9 ANEMIA, UNSPECIFIED TYPE: Primary | ICD-10-CM

## 2018-11-21 RX ORDER — LANCETS 28 GAUGE
EACH MISCELLANEOUS
Qty: 100 EACH | Refills: 5 | Status: SHIPPED | OUTPATIENT
Start: 2018-11-21 | End: 2019-03-07 | Stop reason: ALTCHOICE

## 2018-11-21 NOTE — PROGRESS NOTES
Please call patient regarding anemia, to check iron level to replace  No major procedures will attempt due to her age

## 2018-11-21 NOTE — PROGRESS NOTES
hemoglobin above 10, losing vrs poor intake  To check iron level  If low to replace, no intended to assess stool due to age for procedures if any positive

## 2019-02-24 DIAGNOSIS — I10 ESSENTIAL HYPERTENSION, BENIGN: ICD-10-CM

## 2019-02-25 DIAGNOSIS — I10 ESSENTIAL HYPERTENSION, BENIGN: ICD-10-CM

## 2019-02-25 RX ORDER — LOSARTAN POTASSIUM 50 MG/1
TABLET ORAL
Qty: 30 TABLET | Refills: 0 | Status: SHIPPED | OUTPATIENT
Start: 2019-02-25 | End: 2019-03-07 | Stop reason: ALTCHOICE

## 2019-02-25 RX ORDER — LOSARTAN POTASSIUM 50 MG/1
50 TABLET ORAL DAILY
Qty: 90 TABLET | Refills: 1 | Status: SHIPPED | OUTPATIENT
Start: 2019-02-25 | End: 2019-02-25 | Stop reason: SDUPTHER

## 2019-02-25 RX ORDER — LOSARTAN POTASSIUM 50 MG/1
50 TABLET ORAL DAILY
Qty: 90 TABLET | Refills: 1 | Status: SHIPPED | OUTPATIENT
Start: 2019-02-25 | End: 2019-08-29 | Stop reason: ALTCHOICE

## 2019-03-06 DIAGNOSIS — J30.89 NON-SEASONAL ALLERGIC RHINITIS, UNSPECIFIED TRIGGER: Primary | ICD-10-CM

## 2019-03-06 RX ORDER — FLUTICASONE PROPIONATE 50 MCG
1 SPRAY, SUSPENSION (ML) NASAL DAILY
Qty: 16 G | Refills: 0 | Status: SHIPPED | OUTPATIENT
Start: 2019-03-06 | End: 2019-03-07 | Stop reason: ALTCHOICE

## 2019-03-07 ENCOUNTER — OFFICE VISIT (OUTPATIENT)
Dept: FAMILY MEDICINE CLINIC | Facility: CLINIC | Age: 84
End: 2019-03-07
Payer: MEDICARE

## 2019-03-07 VITALS
HEIGHT: 63 IN | WEIGHT: 175 LBS | BODY MASS INDEX: 31.01 KG/M2 | HEART RATE: 92 BPM | TEMPERATURE: 97.8 F | RESPIRATION RATE: 16 BRPM | DIASTOLIC BLOOD PRESSURE: 70 MMHG | SYSTOLIC BLOOD PRESSURE: 130 MMHG | OXYGEN SATURATION: 98 %

## 2019-03-07 DIAGNOSIS — J06.9 ACUTE UPPER RESPIRATORY INFECTION: Primary | ICD-10-CM

## 2019-03-07 PROCEDURE — 99213 OFFICE O/P EST LOW 20 MIN: CPT | Performed by: FAMILY MEDICINE

## 2019-03-07 RX ORDER — DEXTROMETHORPHAN HYDROBROMIDE AND PROMETHAZINE HYDROCHLORIDE 15; 6.25 MG/5ML; MG/5ML
5 SYRUP ORAL 4 TIMES DAILY PRN
Qty: 118 ML | Refills: 0 | Status: SHIPPED | OUTPATIENT
Start: 2019-03-07 | End: 2019-06-21 | Stop reason: ALTCHOICE

## 2019-03-07 NOTE — PROGRESS NOTES
Assessment/Plan:  1  Acute upper respiratory infection  Patient has acute upper respiratory Viral infections, I explained to patient that it is not bacterial infection and antibiotic is not needed, however bacterial overgrowth and occasionally lead to a bacterial infection (acute bacterial rhinosinusitis), which may requires antibiotic therapy, we will know that only through a follow up visit  Viral URIs also may worsen asthma symptoms (wheezing) in patients with asthma; such symptoms also require further evaluation and treatment  I asked patient to call me or make an apportionment if :   Symptoms are getting worse after 7 days   Symptoms are unchanged after 10 days   experience shortness of breath, chest pain or have any respiratory difficulty   experience a high fever (> 102o F)   develop eye pain/ swelling and/or vision changes   develop severe head or facial pain/swelling       - promethazine-dextromethorphan (PHENERGAN-DM) 6 25-15 mg/5 mL oral syrup; Take 5 mL by mouth 4 (four) times a day as needed for cough  Dispense: 118 mL; Refill: 0      No problem-specific Assessment & Plan notes found for this encounter  Diagnoses and all orders for this visit:    Acute upper respiratory infection  -     promethazine-dextromethorphan (PHENERGAN-DM) 6 25-15 mg/5 mL oral syrup; Take 5 mL by mouth 4 (four) times a day as needed for cough          Subjective:      Patient ID: Leeanna Burks is a 80 y o  female  Patient  complains of cough congestion, sneezing, sore throat and swollen glands for 2 weeks  She denies a history of chest pain and chills and denies a history of asthma  Patient denies smoke cigarettes  Patient tried OTC medications  The following portions of the patient's history were reviewed and updated as appropriate: allergies, current medications, past family history, past medical history, past social history, past surgical history and problem lista      Review of Systems Constitutional: Positive for fatigue and fever  HENT: Positive for congestion, ear pain, postnasal drip, rhinorrhea, sinus pain, sneezing, sore throat and voice change  Negative for hearing loss, mouth sores, tinnitus and trouble swallowing  Eyes: Positive for itching  Negative for discharge  Respiratory: Positive for cough  Negative for chest tightness, shortness of breath and wheezing  Gastrointestinal: Negative for abdominal pain  Endocrine: Negative for cold intolerance and heat intolerance  Genitourinary: Negative for difficulty urinating  Musculoskeletal: Positive for arthralgias  Neurological: Positive for dizziness and weakness  Negative for seizures, syncope, light-headedness and numbness  Psychiatric/Behavioral: Negative for agitation, behavioral problems, confusion and decreased concentration  Objective:      /70 (BP Location: Left arm, Patient Position: Sitting, Cuff Size: Standard)   Pulse 92   Temp 97 8 °F (36 6 °C) (Oral)   Resp 16   Ht 5' 3" (1 6 m)   Wt 79 4 kg (175 lb)   SpO2 98%   Breastfeeding? No   BMI 31 00 kg/m²          Physical Exam   Constitutional: She is oriented to person, place, and time  She appears well-developed and well-nourished  HENT:   Right Ear: There is tenderness  Tympanic membrane is injected  A middle ear effusion is present  Left Ear: Tympanic membrane is injected  Nose: Mucosal edema, rhinorrhea and sinus tenderness present  Right sinus exhibits frontal sinus tenderness  Left sinus exhibits frontal sinus tenderness  Mouth/Throat: Mucous membranes are normal  No oral lesions  Oropharyngeal exudate, posterior oropharyngeal edema and posterior oropharyngeal erythema present  No tonsillar abscesses  Cardiovascular: Normal rate, regular rhythm and normal heart sounds  Abdominal: Soft  Bowel sounds are normal    Musculoskeletal: Normal range of motion  Neurological: She is alert and oriented to person, place, and time  She has normal reflexes  Skin: Skin is warm and dry  Psychiatric: She has a normal mood and affect   Her behavior is normal  Judgment and thought content normal

## 2019-03-07 NOTE — PATIENT INSTRUCTIONS
Por favor wen con detenimiento  Muy Importante!!!    · Groves peso  será revisado  Es posible que [employer] midan groves altura para calcular groves índice de masa corporal formerly [facility]  El [facility] indica si tiene un peso saludable  · Se verificarán groves presión arterial  y frecuencia cardíaca  También pueden revisar grovse temperatura  · Exámenes de [facility] y [facility]  se podría realizar  Se podrían realizar exámenes de rick para revisar los niveles de [facility]  Los niveles anormales de colesterol aumentan el riesgo de enfermedad del corazón y accidente cerebrovascular  Puede que también necesite mulu prueba de rick u orina para revisar si tiene diabetes si usted está en mayor riesgo  Las pruebas de orina pueden hacerse para buscar signos de mulu infección o mulu enfermedad renal      · Un examen físico  incluye la comprobación de saurav latidos del corazón y los pulmones con un estetoscopio  Groves médico también podría revisarle la piel para buscar daños causados por el sol  · Pruebas de detección  podría recomendarse  Se realiza un examen de detección para detectar enfermedades que pueden no causar síntomas  Los exámenes de detección que necesite dependen de groves edad, género, antecedentes familiares y hábitos de meera  Por ejemplo, podrían recomendarle la exploración selectiva colorrectal si tiene 48 años o más  ¿Qué exámenes de detección necesito si soy mulu sobia? · El examen de Papanicolaou  se utiliza para detectar cáncer de carson uterino  El examen del Papanicolaou por lo general se realiza entre 3 a 5 años dependiendo de groves edad  Puede necesitarlo más a menudo si usted ha tenido [employer] de la prueba de Papanicolaou en el pasado  Pregunte a groves médico con qué frecuencia debería realizarse un examen de Papanicolaou  · Mulu mamografía  es mulu radiografía de saurav senos para detectar cáncer de mama  Los expertos recomiendan [address] cada 2 años empezando a los 48 años de [place]   Es probable que usted necesite mulu mamografía a los 52 años o antes si tiene riesgo alto de cáncer de seno  Hable con groves médico sobre cuándo debe empezar con saurav mamografías y con cuánta frecuencia las necesita  ¿Qué vacunas podría necesitar? · Debe recibir Aletha Musty vacuna contra la influenza  todos los [place]  La vacuna contra la influenza protege de la gripe  Varios tipos de virus causan la influenza  Debido a que los virus [place] con el [place], es necesaria la producción de nuevas vacunas cada año  · Debe recibir Aletha Musty vacuna de refuerzo contra el tétanos-difteria (Td)  cada 10 años  Esta vacuna protege contra el tétanos y la difteria  El tétanos es mulu infección severa que puede causar trismo y espasmos musculares dolorosos  La difteria es mulu infección bacterial grave que produce mulu cubierta gruesa en la parte de atrás de la boca y garganta  · Debe recibir la vacuna contra el virus del papiloma humano (VPH)  si usted es sobia y Weston 19 y 32 años o es hombre y Weston 23 y 24 años y nunca la recibió  Esta vacuna protege contra la infección por VPH  El virus del papiloma humano o VPH es la infección más común que se transmite por contacto sexual  El virus del papiloma humano también podría provocar cáncer vaginal, del pene y del ano  · Debe recibir la vacuna antineumocócica  si tiene más de 72 años  La vacuna antineumocócica es mulu inyección que se administra para protegerlo contra mulu enfermedad neumocócica  La enfermedad neumocócica es mulu infección causada por la bacteria neumocócica  La infección puede causar neumonía, meningitis o mulu infección del oído  · Debe recibir la vacuna contra la culebrilla  si tiene [address] [place] [address],[address] [place] [address] o [place], incluso si dunlap tenido culebrilla antes  La vacuna contra la culebrilla (herpes zóster) es mulu inyección usada para proteger contra el virus zóster que causa la varicela  [place] es el mismo virus que causa la varicela   La culebrilla es un sarpullido doloroso que se desarrolla en personas que tuvieron varicela o dunlap estado expuestas al virus  ¿Cómo puedo comer de manera saludable? Mi [place] es un modelo para planear comidas sanas  Muestra los tipos y cantidades de alimentos que deberían ir en un plato  Sherran Pummel y verduras representan alrededor de la mitad de groves plato y los granos y proteínas representan la otra mitad  Se incluye mulu porción de productos lácteos al lado del plato  La cantidad de calorías y [address] de las porciones que usted necesita dependen de groves edad, [place], peso y altura  Los ejemplos de alimentos saludables son:  · Consuma mulu variedad de verduras  lynette las de color kieran oscuro, larsen y [place]  Usted también puede incluir verduras enlatadas bajas en sodio (sal) y verduras congeladas sin mantequilla ni salsas [id number]  · Consuma mulu variedad de fruta frescas , las frutas enlatadas en 100% de jugo , fruta [place] y estephanie secos  · Incluya granos enteros  Por lo menos la mitad de los granos que usted consume deben ser granos de evita integral  Por ejemplo, panes de grano entero, pasta integral, arroz integral y cereales de grano entero lynette la pineda  · Consuma mulu variedad de alimentos con proteínas lynette mariscos (pescado y crustáceos), Pearley Grow y carne de ave sin piel (pavo y alicia)  Ejemplos de boby magras incluyen pierna, paleta o lomo de puerco y shawn, solomillo o, lomo de res y carne [place] de res extra [place]  Otros alimentos ricos en proteínas son los huevos y sustitutos de [place], frijoles, chícharos, productos de soya, nueces y semillas  · Elija productos lácteos bajos en grasa [employer] o del 1% o yogur, queso y requesón bajos en grasa  · Limite las grasas poco saludables,  lynette la [place], la margarina dura y la [place]  ¿Qué cantidad de actividad física necesito? Realice mulu actividad física por lo menos 30 minutos al día, la mayoría de los días de la [place]   Algunos de los ejercicios incluyen caminar, montar en bicicleta, bailar y nadar  Usted también puede realizar más actividad física usando las escaleras en vez de los ascensores o estacionarse más lejos cuando Durel Temitope a las tiendas  Incluya ejercicios para fortalecer los músculos 2 días a la semana  El ejercicio regular proporciona muchos beneficios para la david  Clemetine Closs a controlar groves peso y [employer] riesgo de diabetes tipo 2, presión arterial dereck, enfermedad del corazón y accidente cerebrovascular  El ejercicio [employer] puede ayudar a mejorar groves estado de ánimo  Pregunte a groves médico acerca del mejor plan de ejercicio para usted  ¿Cuáles son Lovella Rony generales de david y seguridad que paige seguir? · No fume  La nicotina y otras sustancias químicas que contienen los cigarrillos y cigarros pueden dañar los pulmones  Pida información a groves médico si usted actualmente fuma y necesita ayuda para dejar de fumar  Los cigarrillos electrónicos o tabaco sin humo todavía contienen nicotina  Consulte con groves médico antes de [employer]  · Limite el consumo de alcohol  Un trago equivale a 12 onzas de cerveza, 5 onzas de vino o 1 onza y ½ de licor  · Baje de peso, si es necesario  El sobrepeso aumenta el riesgo de ciertas condiciones de [place]  Estos incluyen enfermedad del corazón, presión arterial dereck, diabetes tipo 2 y ciertos tipos de cáncer  · Proteja groves piel  No tome el sol ni use barbara de bronceado  Use protector solar con un SPF 13 o mayor  Aplíquese el bloqueador por lo menos 15 minutos antes de que vaya a estar al [employer]  Vuelva a aplicarse la crema solar cada 2 horas  Use ropa protectora, sombrero y lentes para el sol cuando se encuentre afuera  · Conduzca con seguridad  Use siempre groves cinturón de seguridad  Asegúrese que todos en el mallika usan el cinturón de seguridad  Un cinturón de seguridad puede salvar groves meera en reuben de un accidente automovilístico  No use el celular cuando esté manejando   [place] puede hacer que se distraiga y causar un accidente  Es mejor que pare y se orille si necesita hacer mulu Bayron Hockey un texto  · Practique el sexo seguro  Use condones de látex si es sexualmente [place] y tiene más de [place] and [place]  [place] médico puede recomendar exámenes de detección de infecciones de transmisión sexual (ITS)  · Use un rakel, un chaleco salvavidas y unos implementos de protección  Siempre use un rakel al [employer] en bicicleta o motocicleta, esquiar o jugar deportes que podrían causar mulu lesión en la kulwant  Use implementos de protección cuando practique deportes  Use un chaleco salvavidas cuando esté en un bote o practicando actividades acuáticas

## 2019-03-20 ENCOUNTER — OFFICE VISIT (OUTPATIENT)
Dept: URGENT CARE | Facility: MEDICAL CENTER | Age: 84
End: 2019-03-20
Payer: MEDICARE

## 2019-03-20 ENCOUNTER — APPOINTMENT (OUTPATIENT)
Dept: RADIOLOGY | Facility: MEDICAL CENTER | Age: 84
End: 2019-03-20
Payer: MEDICARE

## 2019-03-20 VITALS
BODY MASS INDEX: 31.83 KG/M2 | HEART RATE: 95 BPM | OXYGEN SATURATION: 96 % | TEMPERATURE: 98.9 F | SYSTOLIC BLOOD PRESSURE: 115 MMHG | WEIGHT: 173 LBS | HEIGHT: 62 IN | DIASTOLIC BLOOD PRESSURE: 56 MMHG | RESPIRATION RATE: 16 BRPM

## 2019-03-20 DIAGNOSIS — J18.9 PNEUMONIA OF LEFT LOWER LOBE DUE TO INFECTIOUS ORGANISM: Primary | ICD-10-CM

## 2019-03-20 DIAGNOSIS — J22 LOWER RESP. TRACT INFECTION: ICD-10-CM

## 2019-03-20 PROCEDURE — 71046 X-RAY EXAM CHEST 2 VIEWS: CPT

## 2019-03-20 PROCEDURE — G0463 HOSPITAL OUTPT CLINIC VISIT: HCPCS | Performed by: PHYSICIAN ASSISTANT

## 2019-03-20 PROCEDURE — 99213 OFFICE O/P EST LOW 20 MIN: CPT | Performed by: PHYSICIAN ASSISTANT

## 2019-03-20 RX ORDER — ALBUTEROL SULFATE 90 UG/1
2 AEROSOL, METERED RESPIRATORY (INHALATION) EVERY 6 HOURS PRN
Qty: 1 INHALER | Refills: 0 | Status: ON HOLD | OUTPATIENT
Start: 2019-03-20 | End: 2019-08-01 | Stop reason: SDUPTHER

## 2019-03-20 RX ORDER — BENZONATATE 100 MG/1
100 CAPSULE ORAL 3 TIMES DAILY PRN
Qty: 15 CAPSULE | Refills: 0 | Status: SHIPPED | OUTPATIENT
Start: 2019-03-20 | End: 2019-04-08 | Stop reason: ALTCHOICE

## 2019-03-20 RX ORDER — DOXYCYCLINE 100 MG/1
100 CAPSULE ORAL 2 TIMES DAILY
Qty: 14 CAPSULE | Refills: 0 | Status: SHIPPED | OUTPATIENT
Start: 2019-03-20 | End: 2019-03-27

## 2019-03-20 NOTE — PATIENT INSTRUCTIONS
Take antibiotic as directed until completed  Motrin and/or Tylenol as needed for fever control aches pains  Use additional medications as prescribed for symptomatic relief  Follow up with PCP  Proceed to  ER if symptoms worsen  Pneumonia   AMBULATORY CARE:   Pneumonia  is an infection in your lungs caused by bacteria, viruses, fungi, or parasites  You can become infected if you come in contact with someone who is sick  You can get pneumonia if you recently had surgery or needed a ventilator to help you breathe  Pneumonia can also be caused by accidentally inhaling saliva or small pieces of food  Pneumonia may cause mild symptoms, or it can be severe and life-threatening  Common symptoms include the following:   · Fever or chills    · Cough    · Shortness of breath or rapid breathing    · Chest pain when you cough or breathe deeply    · Headache    · Vomiting    · Fatigue or confusion  Seek care immediately if:   · You cough up blood  · Your heart beats more than 100 beats in 1 minute  · You are very tired, confused, and cannot think clearly  · You have chest pain or trouble breathing  · Your lips or fingernails turn gray or blue  Contact your healthcare provider if:   · Your symptoms are the same or get worse 48 hours after you start antibiotics  · Your fever is not below 99°F (37 2°C) 48 hours after you start antibiotics  · You have a fever higher than 101°F (38 3°C)  · You cannot eat, or you have loss of appetite, nausea, or are vomiting  · You have questions or concerns about your condition or care  Treatment:   · Antibiotics  help treat pneumonia caused by bacteria  · Acetaminophen  decreases pain and fever  It is available without a doctor's order  Ask how much to take and how often to take it  Follow directions   Read the labels of all other medicines you are using to see if they also contain acetaminophen, or ask your doctor or pharmacist  Acetaminophen can cause liver damage if not taken correctly  Do not use more than 4 grams (4,000 milligrams) total of acetaminophen in one day  · NSAIDs , such as ibuprofen, help decrease swelling, pain, and fever  This medicine is available with or without a doctor's order  NSAIDs can cause stomach bleeding or kidney problems in certain people  If you take blood thinner medicine, always ask your healthcare provider if NSAIDs are safe for you  Always read the medicine label and follow directions  · Airway clearance techniques  are exercises to help remove mucus so you can breathe more easily  Your healthcare provider will show you how to do the exercises  These exercises may be used along with machines or devices to help decrease your symptoms  · Respiratory support  is given to help you breathe  You may receive oxygen to increase the level of oxygen in your blood  You may also need a machine to help you breathe  Manage your symptoms:   · Rest as needed  Rest often throughout the day  Alternate times of activity with times of rest     · Drink liquids as directed  Ask how much liquid to drink each day and which liquids are best for you  Liquids help thin your mucus, which may make it easier for you to cough it up  · Do not smoke  Avoid secondhand smoke  Smoking increases your risk for pneumonia  Smoking also makes it harder for you to get better after you have had pneumonia  Ask your healthcare provider for information if you need help to quit smoking  · Use a cool mist humidifier  A humidifier will help increase air moisture in your home  This may make it easier for you to breathe and help decrease your cough  · Keep your head elevated  You may be able to breathe better if you lie down with the head of your bed up  Prevent pneumonia:   · Prevent the spread of germs  Wash your hands often with soap and water  Use gel hand cleanser when there is no soap and water available   Do not touch your eyes, nose, or mouth unless you have washed your hands first  Cover your mouth when you cough  Cough into a tissue or your shirtsleeve so you do not spread germs from your hands  If you are sick, stay away from others as much as possible  · Limit alcohol  Women should limit alcohol to 1 drink a day  Men should limit alcohol to 2 drinks a day  A drink of alcohol is 12 ounces of beer, 5 ounces of wine, or 1½ ounces of liquor  · Ask about vaccines  You may need a vaccine to help prevent pneumonia  Get an influenza (flu) vaccine every year as soon as it becomes available  Follow up with your healthcare provider as directed:  Write down your questions so you remember to ask them during your visits  © 2017 [address] Information is for End User's use only and may not be sold, redistributed or otherwise used for commercial purposes  All illustrations and images included in CareNotes® are the copyrighted property of A D A M , Inc  or Man Kidd  The above information is an  only  It is not intended as medical advice for individual conditions or treatments  Talk to your doctor, nurse or pharmacist before following any medical regimen to see if it is safe and effective for you

## 2019-03-20 NOTE — PROGRESS NOTES
[facility]        NAME: Kade Dunlap is a [address] y o  female  : 1928    MRN: [id number]  DATE: 2019  TIME: 1:11 PM    Assessment and Plan   Pneumonia of left lower lobe due to infectious organism ([address] ) [J18 1]  1  Pneumonia of left lower lobe due to infectious organism (HCC)  XR chest pa & lateral    doxycycline monohydrate (MONODOX) 100 mg capsule    albuterol (PROVENTIL HFA,VENTOLIN HFA) 90 mcg/act inhaler    benzonatate (TESSALON PERLES) 100 mg capsule         Patient Instructions     Take antibiotic as directed until completed  Motrin and/or Tylenol as needed for fever control aches pains  Use additional medications as prescribed for symptomatic relief  Follow up with PCP  Proceed to  ER if symptoms worsen  Chief Complaint     Chief Complaint   Patient presents with    Cough     Patient relates started with a productive cough for 2 weeks  Denies fever  + itchy throat  Seen by PCP x1 week ago and prescribed Promethazine and Flonase  States, promethazine caused her blood sugars to go up  Diabetic type 2 diet controlled  History of Present Illness       45-year-old female presents with cough chest congestion runny nose  Patient was initially seen on 2019 by PCP for similar issues and was diagnosed with URI given promethazine  Patient reports since then symptoms continue and she is feeling a little bit more chest congestion short of breath when she has been coughing  Denies any fevers or chills  No vomiting or diarrhea  Patient was using the promethazine as directed but really was not helping out with her symptoms and was causing her blood sugars to get increased every time she took it  Cough   This is a new problem  The current episode started 1 to 4 weeks ago  The problem has been gradually worsening  The problem occurs constantly  The cough is productive of sputum   Associated symptoms include nasal congestion, rhinorrhea, shortness of breath and wheezing  Pertinent negatives include no chest pain, chills, ear congestion or ear pain  Nothing aggravates the symptoms  She has tried prescription cough suppressant for the symptoms  The treatment provided no relief  There is no history of asthma or COPD  Review of Systems   Review of Systems   Constitutional: Negative for chills  HENT: Positive for rhinorrhea  Negative for ear pain  Respiratory: Positive for cough, shortness of breath and wheezing  Cardiovascular: Negative for chest pain           Current Medications       Current Outpatient Medications:     acetaminophen (TYLENOL) 650 mg CR tablet, take 1 tablet   by oral route  every 8 hours AS NEEDED FOR PAIN, Disp: , Rfl:     famotidine (PEPCID) 40 MG tablet, , Disp: , Rfl: 5    losartan (COZAAR) 50 mg tablet, Take 1 tablet (50 mg total) by mouth daily, Disp: 90 tablet, Rfl: 1    albuterol (PROVENTIL HFA,VENTOLIN HFA) 90 mcg/act inhaler, Inhale 2 puffs every 6 (six) hours as needed for wheezing, Disp: 1 Inhaler, Rfl: 0    benzonatate (TESSALON PERLES) 100 mg capsule, Take 1 capsule (100 mg total) by mouth 3 (three) times a day as needed for cough, Disp: 15 capsule, Rfl: 0    doxycycline monohydrate (MONODOX) 100 mg capsule, Take 1 capsule (100 mg total) by mouth 2 (two) times a day for 7 days, Disp: 14 capsule, Rfl: 0    promethazine-dextromethorphan (PHENERGAN-DM) 6 25-15 mg/5 mL oral syrup, Take 5 mL by mouth 4 (four) times a day as needed for cough (Patient not taking: Reported on 3/20/2019), Disp: 118 mL, Rfl: 0    Current Allergies     Allergies as of 03/20/2019 - Reviewed 03/20/2019   Allergen Reaction Noted    Pioglitazone Rash             The following portions of the patient's history were reviewed and updated as appropriate: allergies, current medications, past family history, past medical history, past social history, past surgical history and problem list      Past Medical History:   Diagnosis Date    Anemia     Arthritis  Diabetes ([address] )     Disease of thyroid gland     GERD (gastroesophageal reflux disease)     Hypertension        Past Surgical History:   Procedure Laterality Date    COLONOSCOPY  09/08/2005    HYSTERECTOMY         Family History   Problem Relation Age of Onset    Coronary artery disease Father          Medications have been verified  Objective   /56   Pulse 95   Temp 98 9 °F (37 2 °C) (Tympanic)   Resp 16   Ht 5' 2" (1 575 m)   Wt 78 5 kg (173 lb)   SpO2 96%   BMI 31 64 kg/m²        Physical Exam     Physical Exam   Constitutional: She is oriented to person, place, and time  She appears well-developed and well-nourished  No distress  HENT:   Head: Normocephalic and atraumatic  Right Ear: External ear normal    Left Ear: External ear normal    Nose: Rhinorrhea present  Mouth/Throat: Oropharynx is clear and moist  No oropharyngeal exudate  Eyes: Conjunctivae and EOM are normal  Right eye exhibits no discharge  Left eye exhibits no discharge  Neck: Normal range of motion  Neck supple  Cardiovascular: Normal rate, regular rhythm, normal heart sounds and intact distal pulses  No murmur heard  Pulmonary/Chest: Effort normal  No respiratory distress  She has decreased breath sounds in the left middle field and the left lower field  She has wheezes in the left middle field and the left lower field  She has no rales  Abdominal: Soft  Bowel sounds are normal  There is no tenderness  Musculoskeletal: Normal range of motion  Lymphadenopathy:     She has no cervical adenopathy  Neurological: She is alert and oriented to person, place, and time  Skin: Skin is warm and dry  Psychiatric: She has a normal mood and affect  Nursing note and vitals reviewed  X-rays reviewed  Left lower lobe pneumonia is suspected

## 2019-03-25 ENCOUNTER — OFFICE VISIT (OUTPATIENT)
Dept: FAMILY MEDICINE CLINIC | Facility: CLINIC | Age: 84
End: 2019-03-25
Payer: MEDICARE

## 2019-03-25 VITALS
SYSTOLIC BLOOD PRESSURE: 120 MMHG | WEIGHT: 169 LBS | OXYGEN SATURATION: 93 % | TEMPERATURE: 98.1 F | HEART RATE: 105 BPM | RESPIRATION RATE: 16 BRPM | DIASTOLIC BLOOD PRESSURE: 60 MMHG | BODY MASS INDEX: 29.95 KG/M2 | HEIGHT: 63 IN

## 2019-03-25 DIAGNOSIS — J18.9 PNEUMONIA OF LEFT LOWER LOBE DUE TO INFECTIOUS ORGANISM: Primary | ICD-10-CM

## 2019-03-25 PROCEDURE — 99213 OFFICE O/P EST LOW 20 MIN: CPT | Performed by: FAMILY MEDICINE

## 2019-03-25 NOTE — PATIENT INSTRUCTIONS
We will repeat x-ray on April 4th  Complete your antibiotics    Check blood sugar every day in the morning before eating for two days then call this officeDiabetes en el adulto mayor   LO QUE NECESITA SABER:   Los adultos mayores con diabetes están en riesgo de tener enfermedad cardíaca, derrame cerebral, enfermedad renal, ceguera y daño a los nervios  También podría estar en riesgo de alguna de las siguientes condiciones:  · Nutrición deficiente o bajos niveles de azúcar en la rick    · Confusión o problemas con la memoria, la atención o para aprender cosas nuevas    · Problemas para controlar la orina o infecciones urinarias frecuentes    · Problemas con la coordinación o el equilibrio    · Caídas y lesiones    · Dolor    · Depresión    · Llagas abiertas en las piernas o los pies  [facility]BRE [facility]:   Llame al 911 en reuben de presentar lo siguiente:   · Usted tiene alguno de los siguientes signos de derrame cerebral:      ¨ Adormecimiento o caída de un lado de groves ángel     ¨ Debilidad en un brazo o mulu pierna    ¨ Confusión o debilidad para hablar    ¨ Mareos o dolor de kulwant intenso, o pérdida de la visión  · Usted tiene alguno de los siguientes signos de un ataque cardíaco:      ¨ Estornudos, presión, o dolor en groves pecho que dura mas de 5 minutos o regresa  ¨ Malestar o dolor en groves espalda, carson, mandíbula, abdomen, o brazo     ¨ Dificultad para respirar    ¨ Náuseas o vómito    ¨ Siente un desvanecimiento o tiene sudores fríos especialmente en el pecho o dificultad para respirar  Regrese a la jose de emergencias si:   · Usted tiene dolor abdominal intenso, o el dolor se extiende a groves espalda  Es posible que también esté vomitando  · Usted tiene dificultad para permanecer despierto o concentrarse  · Usted está temblando o sudando  · Usted tiene visión borrosa o doble  · Groves aliento huele a fruta o pato       · Groves respiración es profunda y [place], o rápida y superficial      · Groves ritmo cardíaco es acelerado y débil  · Sufre mulu caída y se lastima  Pregúntele a groves [place] vitaminas y minerales son adecuados para usted  · Tiene vómitos o diarrea  · Tiene malestar estomacal y no puede ingerir los alimentos de groves plan de comidas  · Usted se siente débil o más cansado de lo habitual      · Usted tiene [place]rey de [place] o se irrita con facilidad  · Groves piel está robby, tibia, seca o inflamada  · Usted tiene mulu herida que no cicatriza  · Usted tiene entumecimiento en los brazos o piernas  · Usted tiene problemas para sobrellevar groves enfermedad, o se siente ansioso o deprimido  · Usted tiene problemas con la memoria  · Usted presenta cambios en groves visión  · Usted tiene preguntas o inquietudes acerca de groves condición o cuidado  Medicamentos,  se puede administrar para disminuir la cantidad de azúcar en groves rick  También puede necesitar medicamentos para bajar groves presión arterial o colesterol, o para prevenir coágulos de rick  Recuerde controlar los factores de la sigla [facility] y prevenga los problemas causados por la diabetes:   · Revise groves nivel de azúcar en la rick lynette se le haya indicado  Groves médico le dirá cuándo y con qué frecuencia lo debe revisar  Groves médico también le indicará cuáles deben ser saurav niveles de azúcar en la rick antes y después de [place] comida  Usted puede necesitar revisar groves orina o rick por la presencia de cetonas, en reuben que groves nivel esté más alto de lo recomendado  Anote saurav resultados y muéstreselos a groves médico  Puede que éste utilice los resultados para realizar modificaciones en groves medicación y groves alimentación o en los horarios en que usted hace ejercicio  [place]dale a groves médico más información acerca de cómo tratar un nivel de azúcar en la rick alto o bajo  · Siga groves plan de comidas según indicaciones    Un dietista lo ayudará a hacer un plan de comidas para mantener groves nivel de azúcar en la rick estable y asegurarse mulu nutrición [place]  No se salte ninguna comida  Groves nivel de azúcar en la rick puede bajar demasiado si usted ha tomado medicamento para la diabetes y no ha comido  Consulte con groves médico acerca de los programas en groves comunidad que pueden ofrecerle la entrega de comidas a domicilio  · Intente estar activo de 30 a 60 minutos sebastien todos los días de la [place]  La actividad física puede ayudarlo a mantener el nivel de glucosa en la rick estable, disminuir groves riesgo de insuficiencia cardíaca y [place] a bajar de [place]  También puede ayudarlo a mejorar groves equilibrio y [place] riesgo de caídas  Colabore con groves médico para elaborar un plan de ejercicios  Pídale a un familiar o amigo que jamil ejercicio con usted  Comience lentamente y ejercite de 5 a 10 minutos a la vez  Ejemplos de actividades incluyen caminar o nadar  Incluya ejercicios para fortalecer los músculos de 2 a 3 días a la semana  Incluya entrenamiento del equilibrio de 2 a 3 veces a la semana  Actividades que ayudan a aumentar el equilibrio incluyen yoga y karen chi      · Mantenga un peso saludable  Consulte con groves médico cuánto debería pesar  El peso saludable puede ayudarle a controlar groves diabetes y a evitar mulu enfermedad cardíaca  Solicite a groves médico que le ayude a crear un plan para perder peso de mulu forma patel si usted tiene sobrepeso  Juntos podrán fijar metas de pérdida de peso alcanzables  · No fume  Pida información a groves médico si usted actualmente fuma y necesita ayuda para dejar de fumar  No use cigarrillos electrónicos o tabaco sin humo en vez de cigarrillos o para tratar de dejar de fumar  Todos estos aún contienen nicotina  · Controle el estrés  El estrés puede aumentar groves nivel de azúcar en la rick  La respiración profunda, la relajación muscular y la música pueden ayudarlo a relajarse  Solicite a groves médico más información sobre estas prácticas    Otras maneras de controlar groves diabetes:   · Revise edda pies todos los días para krishan si tienen llagas  Observe todo el pie, incluyendo la planta del pie, entre y [employer] dedos  Revise si hay heridas y callos  Use un yamini para verse la planta de los pies  La piel de los pies podría estar brillante, tirante, seca o más oscura de lo normal  Edda pies también podrían estar fríos y pálidos  Pase edda mell por encima, por debajo, por los lados y [facility] dedos del pie para sentir la piel  El enrojecimiento, inflamación y calor son signos de problemas con el flujo sanguíneo que pueden conllevar a mulu úlcera en el pie  No trate de quitarse los callos usted mismo  · Use identificación de alerta médica  Use un brazalete o collar de alerta médica o lleve consigo mulu tarjeta que indique que tiene diabetes  Pregúntele a groves médico dónde conseguir estos artículos  · [place] vacunas  Usted corre un mayor riesgo de presentar enfermedades graves si usted contrae la gripe, neumonía o hepatitis  Pregúntele a groves médico si usted debe ponerse la vacuna contra la gripe, la neumonía o la hepatitis B, y cuándo debe hacerlo  · Asista a todas edda citas  Necesitará regresar para que le realicen el examen de hemoglobina A1c cada 3 meses  Tendrá que regresar por lo menos mulu vez al año para que le revisen los pies  Necesitará de un examen de la vista mulu vez al año para revisar si tiene retinopatía  También necesitará exámenes de orina anuales para revisar si hay problemas renales  Es posible que deba realizarse exámenes para detectar enfermedad cardíaca  Anote edda preguntas para que se acuerde de hacerlas ariadne edda visitas  · Pídale ayuda a edda familiares y amigos  Puede que necesite ayuda para comprobar groves nivel de azúcar en la rick, inyectarse la insulina o preparar las comidas  Pídale a edda familiareas y amigos que lo ayuden con estas tareas  Hable con groves médico si no tiene a nadie que le ayude en groves hogar   Un médico puede venir a fletcher casa para ayudarlo con estas tareas  Acuda a saurav consultas de control con fletcher médico según le indicaron  Necesitará regresar para que le realicen el examen de hemoglobina A1c cada 3 meses  Tendrá que regresar por lo menos mulu vez al año para que le revisen los pies  Necesitará de un examen de la vista mulu vez al año para revisar si tiene retinopatía  También necesitará exámenes de orina anuales para revisar si hay problemas renales  Es posible que deba realizarse exámenes para detectar enfermedad cardíaca  Anote saurav preguntas para que se acuerde de hacerlas ariadne saurav visitas  © 2017 [address] Information is for End User's use only and may not be sold, redistributed or otherwise used for commercial purposes  All illustrations and images included in CareNotes® are the copyrighted property of A D A M , Inc  or [employer]  Esta información es sólo para uso en educación  Fletcher intención no es darle un consejo médico sobre enfermedades o tratamientos  Colsulte con fletcher [place] farmacéutico antes de seguir cualquier régimen médico para saber si es seguro y efectivo para usted

## 2019-03-25 NOTE — PROGRESS NOTES
Assessment/Plan:  1  Pneumonia of left lower lobe due to infectious organism [facility])  Patient is doing well, she has hyperglycemia  I asked the patient to check blood sugars in a m  And report to decide further treatments  To repeat x-ray of the chest on April 4th to ensure recovery  - XR chest pa & lateral; Future    No problem-specific Assessment & Plan notes found for this encounter  Diagnoses and all orders for this visit:    Pneumonia of left lower lobe due to infectious organism ([address] )  -     XR chest pa & lateral; Future          Subjective:      Patient ID: Jina Aiken is a 80 y o  female  Post urgent center Pneumonia  Patient was given treatment with clindamycin on March 20th, she will end treatment on March 27th  She denies any side effect on medication  She states improvement from pneumonia the but still shortness of breath  Having hyperglycemia  Not taking any medication for diabetes  The following portions of the patient's history were reviewed and updated as appropriate: allergies, current medications, past family history, past medical history, past social history, past surgical history and problem list     Review of Systems   Constitutional: Negative for diaphoresis, fatigue, fever and unexpected weight change  Respiratory: Positive for cough and shortness of breath  Negative for chest tightness and wheezing  Cardiovascular: Negative for chest pain, palpitations and leg swelling  Gastrointestinal: Negative for abdominal pain, blood in stool, nausea and vomiting  Endocrine:        Hyperglycemia  Patient is diabetic but not on medications  Neurological: Negative for dizziness, syncope, light-headedness and headaches  Hematological: Does not bruise/bleed easily  Psychiatric/Behavioral: Negative for behavioral problems, self-injury and sleep disturbance  The patient is not nervous/anxious            Objective:      /60 (BP Location: Left arm, Patient Position: Sitting, Cuff Size: Standard)   Pulse 105   Temp 98 1 °F (36 7 °C) (Oral)   Resp 16   Ht 5' 3" (1 6 m)   Wt 76 7 kg (169 lb)   SpO2 93%   Breastfeeding? No   BMI 29 94 kg/m²          Physical Exam   HENT:   Head: Normocephalic  Right Ear: External ear normal    Mouth/Throat: Oropharynx is clear and moist    Eyes: Pupils are equal, round, and reactive to light  EOM are normal  Right eye exhibits no discharge  Left eye exhibits no discharge  No scleral icterus  Neck: Normal range of motion  No tracheal deviation present  No thyromegaly present  Cardiovascular: Normal rate, regular rhythm, normal heart sounds and intact distal pulses  Exam reveals no friction rub  No murmur heard  Pulmonary/Chest: No stridor  No respiratory distress  She has no wheezes  She has no rales  She exhibits no tenderness  Decreased breath sounds in the low left base  X-rays show parahiliar infiltrate on the left side  Abdominal: Soft  Musculoskeletal: Normal range of motion  Lymphadenopathy:     She has no cervical adenopathy  Neurological: She is alert  Skin: Skin is warm and dry  No rash noted  No erythema  Psychiatric: She has a normal mood and affect  Her behavior is normal    Nursing note and vitals reviewed

## 2019-04-04 ENCOUNTER — APPOINTMENT (OUTPATIENT)
Dept: RADIOLOGY | Facility: MEDICAL CENTER | Age: 84
End: 2019-04-04
Payer: MEDICARE

## 2019-04-04 DIAGNOSIS — J18.9 PNEUMONIA OF LEFT LOWER LOBE DUE TO INFECTIOUS ORGANISM: ICD-10-CM

## 2019-04-04 PROCEDURE — 71046 X-RAY EXAM CHEST 2 VIEWS: CPT

## 2019-04-08 ENCOUNTER — OFFICE VISIT (OUTPATIENT)
Dept: FAMILY MEDICINE CLINIC | Facility: CLINIC | Age: 84
End: 2019-04-08
Payer: MEDICARE

## 2019-04-08 VITALS
TEMPERATURE: 98.1 F | SYSTOLIC BLOOD PRESSURE: 120 MMHG | RESPIRATION RATE: 16 BRPM | WEIGHT: 168 LBS | HEART RATE: 92 BPM | OXYGEN SATURATION: 95 % | HEIGHT: 63 IN | BODY MASS INDEX: 29.77 KG/M2 | DIASTOLIC BLOOD PRESSURE: 70 MMHG

## 2019-04-08 DIAGNOSIS — Z00.01 ENCOUNTER FOR GENERAL ADULT MEDICAL EXAMINATION WITH ABNORMAL FINDINGS: Primary | ICD-10-CM

## 2019-04-08 DIAGNOSIS — IMO0001 UNCONTROLLED DIABETES MELLITUS TYPE 2 WITHOUT COMPLICATIONS: ICD-10-CM

## 2019-04-08 PROCEDURE — G0439 PPPS, SUBSEQ VISIT: HCPCS | Performed by: FAMILY MEDICINE

## 2019-05-13 ENCOUNTER — OFFICE VISIT (OUTPATIENT)
Dept: FAMILY MEDICINE CLINIC | Facility: CLINIC | Age: 84
End: 2019-05-13
Payer: MEDICARE

## 2019-05-13 VITALS
SYSTOLIC BLOOD PRESSURE: 120 MMHG | HEART RATE: 92 BPM | BODY MASS INDEX: 29.23 KG/M2 | TEMPERATURE: 98 F | OXYGEN SATURATION: 98 % | RESPIRATION RATE: 16 BRPM | DIASTOLIC BLOOD PRESSURE: 60 MMHG | WEIGHT: 165 LBS | HEIGHT: 63 IN

## 2019-05-13 DIAGNOSIS — J30.89 ALLERGIC RHINITIS DUE TO OTHER ALLERGIC TRIGGER, UNSPECIFIED SEASONALITY: Primary | ICD-10-CM

## 2019-05-13 DIAGNOSIS — K29.30 SUPERFICIAL GASTRITIS WITHOUT HEMORRHAGE, UNSPECIFIED CHRONICITY: ICD-10-CM

## 2019-05-13 DIAGNOSIS — M17.0 PRIMARY OSTEOARTHRITIS OF BOTH KNEES: ICD-10-CM

## 2019-05-13 DIAGNOSIS — IMO0001 UNCONTROLLED DIABETES MELLITUS TYPE 2 WITHOUT COMPLICATIONS: ICD-10-CM

## 2019-05-13 PROCEDURE — 99214 OFFICE O/P EST MOD 30 MIN: CPT | Performed by: FAMILY MEDICINE

## 2019-05-13 RX ORDER — FLUTICASONE PROPIONATE 50 MCG
1 SPRAY, SUSPENSION (ML) NASAL DAILY
Qty: 16 G | Refills: 0 | Status: SHIPPED | OUTPATIENT
Start: 2019-05-13 | End: 2019-07-31

## 2019-05-13 RX ORDER — OMEPRAZOLE 20 MG/1
20 CAPSULE, DELAYED RELEASE ORAL DAILY
Qty: 30 CAPSULE | Refills: 1 | Status: SHIPPED | OUTPATIENT
Start: 2019-05-13 | End: 2019-07-24 | Stop reason: SDUPTHER

## 2019-05-14 PROBLEM — J30.9 ALLERGIC RHINITIS: Status: ACTIVE | Noted: 2019-05-14

## 2019-06-21 ENCOUNTER — OFFICE VISIT (OUTPATIENT)
Dept: FAMILY MEDICINE CLINIC | Facility: CLINIC | Age: 84
End: 2019-06-21
Payer: MEDICARE

## 2019-06-21 ENCOUNTER — APPOINTMENT (OUTPATIENT)
Dept: LAB | Facility: MEDICAL CENTER | Age: 84
End: 2019-06-21
Payer: MEDICARE

## 2019-06-21 ENCOUNTER — APPOINTMENT (OUTPATIENT)
Dept: RADIOLOGY | Facility: MEDICAL CENTER | Age: 84
End: 2019-06-21
Payer: MEDICARE

## 2019-06-21 VITALS
BODY MASS INDEX: 29.06 KG/M2 | TEMPERATURE: 99.2 F | DIASTOLIC BLOOD PRESSURE: 70 MMHG | HEART RATE: 92 BPM | RESPIRATION RATE: 16 BRPM | HEIGHT: 63 IN | SYSTOLIC BLOOD PRESSURE: 150 MMHG | OXYGEN SATURATION: 95 % | WEIGHT: 164 LBS

## 2019-06-21 DIAGNOSIS — R06.02 SOB (SHORTNESS OF BREATH): ICD-10-CM

## 2019-06-21 DIAGNOSIS — H66.016 RECURRENT ACUTE SUPPURATIVE OTITIS MEDIA WITH SPONTANEOUS RUPTURE OF BOTH TYMPANIC MEMBRANES: Primary | ICD-10-CM

## 2019-06-21 LAB
ALBUMIN SERPL BCP-MCNC: 3.7 G/DL (ref 3.5–5)
ALP SERPL-CCNC: 93 U/L (ref 46–116)
ALT SERPL W P-5'-P-CCNC: 16 U/L (ref 12–78)
ANION GAP SERPL CALCULATED.3IONS-SCNC: 6 MMOL/L (ref 4–13)
AST SERPL W P-5'-P-CCNC: 16 U/L (ref 5–45)
BASOPHILS # BLD AUTO: 0.03 THOUSANDS/ΜL (ref 0–0.1)
BASOPHILS NFR BLD AUTO: 0 % (ref 0–1)
BILIRUB SERPL-MCNC: 0.3 MG/DL (ref 0.2–1)
BUN SERPL-MCNC: 24 MG/DL (ref 5–25)
CALCIUM SERPL-MCNC: 9.4 MG/DL (ref 8.3–10.1)
CHLORIDE SERPL-SCNC: 107 MMOL/L (ref 100–108)
CO2 SERPL-SCNC: 28 MMOL/L (ref 21–32)
CREAT SERPL-MCNC: 0.95 MG/DL (ref 0.6–1.3)
EOSINOPHIL # BLD AUTO: 0.06 THOUSAND/ΜL (ref 0–0.61)
EOSINOPHIL NFR BLD AUTO: 1 % (ref 0–6)
ERYTHROCYTE [DISTWIDTH] IN BLOOD BY AUTOMATED COUNT: 14.3 % (ref 11.6–15.1)
GFR SERPL CREATININE-BSD FRML MDRD: 53 ML/MIN/1.73SQ M
GLUCOSE SERPL-MCNC: 150 MG/DL (ref 65–140)
HCT VFR BLD AUTO: 33.8 % (ref 34.8–46.1)
HGB BLD-MCNC: 10.5 G/DL (ref 11.5–15.4)
IMM GRANULOCYTES # BLD AUTO: 0.06 THOUSAND/UL (ref 0–0.2)
IMM GRANULOCYTES NFR BLD AUTO: 1 % (ref 0–2)
LYMPHOCYTES # BLD AUTO: 0.83 THOUSANDS/ΜL (ref 0.6–4.47)
LYMPHOCYTES NFR BLD AUTO: 12 % (ref 14–44)
MCH RBC QN AUTO: 28.8 PG (ref 26.8–34.3)
MCHC RBC AUTO-ENTMCNC: 31.1 G/DL (ref 31.4–37.4)
MCV RBC AUTO: 93 FL (ref 82–98)
MONOCYTES # BLD AUTO: 1.79 THOUSAND/ΜL (ref 0.17–1.22)
MONOCYTES NFR BLD AUTO: 26 % (ref 4–12)
NEUTROPHILS # BLD AUTO: 4.23 THOUSANDS/ΜL (ref 1.85–7.62)
NEUTS SEG NFR BLD AUTO: 60 % (ref 43–75)
NRBC BLD AUTO-RTO: 0 /100 WBCS
PLATELET # BLD AUTO: 291 THOUSANDS/UL (ref 149–390)
PMV BLD AUTO: 11.4 FL (ref 8.9–12.7)
POTASSIUM SERPL-SCNC: 4.2 MMOL/L (ref 3.5–5.3)
PROT SERPL-MCNC: 7.6 G/DL (ref 6.4–8.2)
RBC # BLD AUTO: 3.64 MILLION/UL (ref 3.81–5.12)
SODIUM SERPL-SCNC: 141 MMOL/L (ref 136–145)
WBC # BLD AUTO: 7 THOUSAND/UL (ref 4.31–10.16)

## 2019-06-21 PROCEDURE — 85025 COMPLETE CBC W/AUTO DIFF WBC: CPT

## 2019-06-21 PROCEDURE — 80053 COMPREHEN METABOLIC PANEL: CPT

## 2019-06-21 PROCEDURE — 71046 X-RAY EXAM CHEST 2 VIEWS: CPT

## 2019-06-21 PROCEDURE — 36415 COLL VENOUS BLD VENIPUNCTURE: CPT

## 2019-06-21 PROCEDURE — 99214 OFFICE O/P EST MOD 30 MIN: CPT | Performed by: FAMILY MEDICINE

## 2019-06-21 RX ORDER — AMOXICILLIN 500 MG/1
500 CAPSULE ORAL EVERY 8 HOURS SCHEDULED
Qty: 21 CAPSULE | Refills: 0 | Status: SHIPPED | OUTPATIENT
Start: 2019-06-21 | End: 2019-06-28

## 2019-06-24 DIAGNOSIS — D64.9 ANEMIA, UNSPECIFIED TYPE: Primary | ICD-10-CM

## 2019-06-24 RX ORDER — FERROUS SULFATE TAB EC 324 MG (65 MG FE EQUIVALENT) 324 (65 FE) MG
324 TABLET DELAYED RESPONSE ORAL
Qty: 60 TABLET | Refills: 2 | Status: SHIPPED | OUTPATIENT
Start: 2019-06-24 | End: 2020-02-14 | Stop reason: SDUPTHER

## 2019-06-26 ENCOUNTER — DOCUMENTATION (OUTPATIENT)
Dept: FAMILY MEDICINE CLINIC | Facility: CLINIC | Age: 84
End: 2019-06-26

## 2019-06-26 DIAGNOSIS — J18.9 RECURRENT PNEUMONIA: Primary | ICD-10-CM

## 2019-07-02 ENCOUNTER — HOSPITAL ENCOUNTER (OUTPATIENT)
Dept: CT IMAGING | Facility: HOSPITAL | Age: 84
Discharge: HOME | End: 2019-07-02
Payer: MEDICARE

## 2019-07-02 DIAGNOSIS — J18.9 RECURRENT PNEUMONIA: ICD-10-CM

## 2019-07-02 PROCEDURE — 71250 CT THORAX DX C-: CPT

## 2019-07-05 DIAGNOSIS — J18.9 PNEUMONIA OF LEFT LOWER LOBE DUE TO INFECTIOUS ORGANISM: Primary | ICD-10-CM

## 2019-07-05 RX ORDER — AZITHROMYCIN 500 MG/1
500 TABLET, FILM COATED ORAL DAILY
Qty: 5 TABLET | Refills: 0 | Status: SHIPPED | OUTPATIENT
Start: 2019-07-05 | End: 2019-07-10

## 2019-07-24 DIAGNOSIS — K29.30 SUPERFICIAL GASTRITIS WITHOUT HEMORRHAGE, UNSPECIFIED CHRONICITY: ICD-10-CM

## 2019-07-24 RX ORDER — OMEPRAZOLE 20 MG/1
20 CAPSULE, DELAYED RELEASE ORAL DAILY
Qty: 30 CAPSULE | Refills: 1 | Status: SHIPPED | OUTPATIENT
Start: 2019-07-24 | End: 2019-09-09 | Stop reason: SDUPTHER

## 2019-07-31 ENCOUNTER — APPOINTMENT (EMERGENCY)
Dept: RADIOLOGY | Facility: HOSPITAL | Age: 84
DRG: 179 | End: 2019-07-31
Payer: MEDICARE

## 2019-07-31 ENCOUNTER — HOSPITAL ENCOUNTER (INPATIENT)
Facility: HOSPITAL | Age: 84
LOS: 1 days | Discharge: HOME | DRG: 179 | End: 2019-08-01
Attending: EMERGENCY MEDICINE | Admitting: HOSPITALIST
Payer: MEDICARE

## 2019-07-31 DIAGNOSIS — J18.9 PNEUMONIA OF LEFT LOWER LOBE DUE TO INFECTIOUS ORGANISM: ICD-10-CM

## 2019-07-31 DIAGNOSIS — K21.9 GERD (GASTROESOPHAGEAL REFLUX DISEASE): ICD-10-CM

## 2019-07-31 DIAGNOSIS — R06.02 SOB (SHORTNESS OF BREATH): Primary | ICD-10-CM

## 2019-07-31 DIAGNOSIS — J18.9 LEFT LOWER LOBE PNEUMONIA: ICD-10-CM

## 2019-07-31 PROBLEM — R07.9 CHEST PAIN: Status: ACTIVE | Noted: 2019-07-31

## 2019-07-31 LAB
ANION GAP SERPL CALCULATED.3IONS-SCNC: 11 MMOL/L (ref 4–13)
ATRIAL RATE: 84 BPM
BACTERIA UR QL AUTO: ABNORMAL /HPF
BASOPHILS # BLD AUTO: 0.03 THOUSANDS/ΜL (ref 0–0.1)
BASOPHILS NFR BLD AUTO: 1 % (ref 0–1)
BILIRUB UR QL STRIP: NEGATIVE
BUN SERPL-MCNC: 17 MG/DL (ref 5–25)
CALCIUM SERPL-MCNC: 9.6 MG/DL (ref 8.3–10.1)
CHLORIDE SERPL-SCNC: 101 MMOL/L (ref 100–108)
CLARITY UR: CLEAR
CO2 SERPL-SCNC: 26 MMOL/L (ref 21–32)
COLOR UR: YELLOW
CREAT SERPL-MCNC: 1.01 MG/DL (ref 0.6–1.3)
EOSINOPHIL # BLD AUTO: 0.06 THOUSAND/ΜL (ref 0–0.61)
EOSINOPHIL NFR BLD AUTO: 1 % (ref 0–6)
ERYTHROCYTE [DISTWIDTH] IN BLOOD BY AUTOMATED COUNT: 14.1 % (ref 11.6–15.1)
GFR SERPL CREATININE-BSD FRML MDRD: 49 ML/MIN/1.73SQ M
GLUCOSE SERPL-MCNC: 159 MG/DL (ref 65–140)
GLUCOSE UR STRIP-MCNC: NEGATIVE MG/DL
HCT VFR BLD AUTO: 34.5 % (ref 34.8–46.1)
HGB BLD-MCNC: 11.2 G/DL (ref 11.5–15.4)
HGB UR QL STRIP.AUTO: ABNORMAL
IMM GRANULOCYTES # BLD AUTO: 0.05 THOUSAND/UL (ref 0–0.2)
IMM GRANULOCYTES NFR BLD AUTO: 1 % (ref 0–2)
KETONES UR STRIP-MCNC: NEGATIVE MG/DL
LEUKOCYTE ESTERASE UR QL STRIP: NEGATIVE
LYMPHOCYTES # BLD AUTO: 1.12 THOUSANDS/ΜL (ref 0.6–4.47)
LYMPHOCYTES NFR BLD AUTO: 19 % (ref 14–44)
MCH RBC QN AUTO: 29.6 PG (ref 26.8–34.3)
MCHC RBC AUTO-ENTMCNC: 32.5 G/DL (ref 31.4–37.4)
MCV RBC AUTO: 91 FL (ref 82–98)
MONOCYTES # BLD AUTO: 1.39 THOUSAND/ΜL (ref 0.17–1.22)
MONOCYTES NFR BLD AUTO: 23 % (ref 4–12)
NEUTROPHILS # BLD AUTO: 3.41 THOUSANDS/ΜL (ref 1.85–7.62)
NEUTS SEG NFR BLD AUTO: 55 % (ref 43–75)
NITRITE UR QL STRIP: NEGATIVE
NON-SQ EPI CELLS URNS QL MICRO: ABNORMAL /HPF
NRBC BLD AUTO-RTO: 0 /100 WBCS
P AXIS: 122 DEGREES
PH UR STRIP.AUTO: 7 [PH] (ref 4.5–8)
PLATELET # BLD AUTO: 234 THOUSANDS/UL (ref 149–390)
PLATELET # BLD AUTO: 255 THOUSANDS/UL (ref 149–390)
PMV BLD AUTO: 11.2 FL (ref 8.9–12.7)
PMV BLD AUTO: 11.5 FL (ref 8.9–12.7)
POTASSIUM SERPL-SCNC: 4.2 MMOL/L (ref 3.5–5.3)
PR INTERVAL: 164 MS
PROCALCITONIN SERPL-MCNC: <0.05 NG/ML
PROT UR STRIP-MCNC: NEGATIVE MG/DL
QRS AXIS: 14 DEGREES
QRSD INTERVAL: 80 MS
QT INTERVAL: 370 MS
QTC INTERVAL: 437 MS
RBC # BLD AUTO: 3.78 MILLION/UL (ref 3.81–5.12)
RBC #/AREA URNS AUTO: ABNORMAL /HPF
SODIUM SERPL-SCNC: 138 MMOL/L (ref 136–145)
SP GR UR STRIP.AUTO: 1.01 (ref 1–1.03)
T WAVE AXIS: 69 DEGREES
TROPONIN I SERPL-MCNC: <0.02 NG/ML
UROBILINOGEN UR QL STRIP.AUTO: 1 E.U./DL
VENTRICULAR RATE: 84 BPM
WBC # BLD AUTO: 6.06 THOUSAND/UL (ref 4.31–10.16)
WBC #/AREA URNS AUTO: ABNORMAL /HPF

## 2019-07-31 PROCEDURE — 96375 TX/PRO/DX INJ NEW DRUG ADDON: CPT

## 2019-07-31 PROCEDURE — 99285 EMERGENCY DEPT VISIT HI MDM: CPT

## 2019-07-31 PROCEDURE — 80048 BASIC METABOLIC PNL TOTAL CA: CPT | Performed by: EMERGENCY MEDICINE

## 2019-07-31 PROCEDURE — 81001 URINALYSIS AUTO W/SCOPE: CPT

## 2019-07-31 PROCEDURE — 87449 NOS EACH ORGANISM AG IA: CPT | Performed by: PHYSICIAN ASSISTANT

## 2019-07-31 PROCEDURE — 85049 AUTOMATED PLATELET COUNT: CPT | Performed by: PHYSICIAN ASSISTANT

## 2019-07-31 PROCEDURE — 36415 COLL VENOUS BLD VENIPUNCTURE: CPT | Performed by: EMERGENCY MEDICINE

## 2019-07-31 PROCEDURE — 84145 PROCALCITONIN (PCT): CPT | Performed by: PHYSICIAN ASSISTANT

## 2019-07-31 PROCEDURE — 96374 THER/PROPH/DIAG INJ IV PUSH: CPT

## 2019-07-31 PROCEDURE — 87040 BLOOD CULTURE FOR BACTERIA: CPT | Performed by: EMERGENCY MEDICINE

## 2019-07-31 PROCEDURE — 93005 ELECTROCARDIOGRAM TRACING: CPT

## 2019-07-31 PROCEDURE — 99222 1ST HOSP IP/OBS MODERATE 55: CPT | Performed by: HOSPITALIST

## 2019-07-31 PROCEDURE — 99285 EMERGENCY DEPT VISIT HI MDM: CPT | Performed by: EMERGENCY MEDICINE

## 2019-07-31 PROCEDURE — 93010 ELECTROCARDIOGRAM REPORT: CPT | Performed by: INTERNAL MEDICINE

## 2019-07-31 PROCEDURE — 84484 ASSAY OF TROPONIN QUANT: CPT | Performed by: EMERGENCY MEDICINE

## 2019-07-31 PROCEDURE — 71046 X-RAY EXAM CHEST 2 VIEWS: CPT

## 2019-07-31 PROCEDURE — 84484 ASSAY OF TROPONIN QUANT: CPT | Performed by: PHYSICIAN ASSISTANT

## 2019-07-31 PROCEDURE — 85025 COMPLETE CBC W/AUTO DIFF WBC: CPT | Performed by: EMERGENCY MEDICINE

## 2019-07-31 RX ORDER — LOSARTAN POTASSIUM 50 MG/1
50 TABLET ORAL DAILY
Status: DISCONTINUED | OUTPATIENT
Start: 2019-08-01 | End: 2019-08-01 | Stop reason: HOSPADM

## 2019-07-31 RX ORDER — PANTOPRAZOLE SODIUM 20 MG/1
20 TABLET, DELAYED RELEASE ORAL
Status: DISCONTINUED | OUTPATIENT
Start: 2019-08-01 | End: 2019-08-01 | Stop reason: HOSPADM

## 2019-07-31 RX ORDER — ASPIRIN 81 MG/1
324 TABLET, CHEWABLE ORAL ONCE
Status: COMPLETED | OUTPATIENT
Start: 2019-07-31 | End: 2019-07-31

## 2019-07-31 RX ORDER — ONDANSETRON 2 MG/ML
4 INJECTION INTRAMUSCULAR; INTRAVENOUS ONCE
Status: COMPLETED | OUTPATIENT
Start: 2019-07-31 | End: 2019-07-31

## 2019-07-31 RX ORDER — FAMOTIDINE 20 MG/1
40 TABLET, FILM COATED ORAL
Status: DISCONTINUED | OUTPATIENT
Start: 2019-08-01 | End: 2019-08-01 | Stop reason: HOSPADM

## 2019-07-31 RX ORDER — HEPARIN SODIUM 5000 [USP'U]/ML
5000 INJECTION, SOLUTION INTRAVENOUS; SUBCUTANEOUS EVERY 8 HOURS SCHEDULED
Status: DISCONTINUED | OUTPATIENT
Start: 2019-07-31 | End: 2019-08-01 | Stop reason: HOSPADM

## 2019-07-31 RX ORDER — FERROUS SULFATE 325(65) MG
325 TABLET ORAL 2 TIMES DAILY WITH MEALS
Status: DISCONTINUED | OUTPATIENT
Start: 2019-07-31 | End: 2019-08-01 | Stop reason: HOSPADM

## 2019-07-31 RX ORDER — ALBUTEROL SULFATE 90 UG/1
2 AEROSOL, METERED RESPIRATORY (INHALATION) EVERY 6 HOURS PRN
Status: DISCONTINUED | OUTPATIENT
Start: 2019-07-31 | End: 2019-08-01 | Stop reason: HOSPADM

## 2019-07-31 RX ORDER — ONDANSETRON 2 MG/ML
4 INJECTION INTRAMUSCULAR; INTRAVENOUS EVERY 6 HOURS PRN
Status: DISCONTINUED | OUTPATIENT
Start: 2019-07-31 | End: 2019-08-01 | Stop reason: HOSPADM

## 2019-07-31 RX ORDER — AZITHROMYCIN 250 MG/1
500 TABLET, FILM COATED ORAL EVERY 24 HOURS
Status: DISCONTINUED | OUTPATIENT
Start: 2019-08-01 | End: 2019-08-01

## 2019-07-31 RX ORDER — ACETAMINOPHEN 325 MG/1
650 TABLET ORAL EVERY 6 HOURS SCHEDULED
Status: DISCONTINUED | OUTPATIENT
Start: 2019-07-31 | End: 2019-08-01 | Stop reason: HOSPADM

## 2019-07-31 RX ADMIN — METRONIDAZOLE 500 MG: 500 INJECTION, SOLUTION INTRAVENOUS at 17:47

## 2019-07-31 RX ADMIN — ACETAMINOPHEN 650 MG: 325 TABLET ORAL at 17:46

## 2019-07-31 RX ADMIN — ACETAMINOPHEN 650 MG: 325 TABLET ORAL at 23:28

## 2019-07-31 RX ADMIN — ONDANSETRON 4 MG: 2 INJECTION INTRAMUSCULAR; INTRAVENOUS at 14:59

## 2019-07-31 RX ADMIN — CEFTRIAXONE SODIUM 1000 MG: 10 INJECTION, POWDER, FOR SOLUTION INTRAVENOUS at 15:46

## 2019-07-31 RX ADMIN — ASPIRIN 81 MG 324 MG: 81 TABLET ORAL at 14:59

## 2019-07-31 RX ADMIN — FERROUS SULFATE TAB 325 MG (65 MG ELEMENTAL FE) 325 MG: 325 (65 FE) TAB at 17:47

## 2019-07-31 RX ADMIN — HEPARIN SODIUM 5000 UNITS: 5000 INJECTION INTRAVENOUS; SUBCUTANEOUS at 21:39

## 2019-07-31 RX ADMIN — AZITHROMYCIN MONOHYDRATE 500 MG: 500 INJECTION, POWDER, LYOPHILIZED, FOR SOLUTION INTRAVENOUS at 16:36

## 2019-07-31 NOTE — ASSESSMENT & PLAN NOTE
Substernal, pleuritic, worse w/inspiration and lying down concerning for gastroesophagitis especially in setting of #1  Troponin negative  Continue op regimen of PPI/H2 blocker, trend troponins for completeness

## 2019-07-31 NOTE — H&P
H&P- Kelly Chand 6/14/1928, 80 y o  female MRN: [id number]    Unit/Bed#: E5 -01 Encounter: [id number]    Primary Care Provider: Meghan Hicks MD   Date and time admitted to hospital: 7/31/2019  2:28 PM      History and Physical - [facility]    Patient Information: Kelly Chand 80 y o  female MRN: [id number]  Unit/Bed#: E5 -01 Encounter: [id number]  Admitting Physician: Edi Silverman PA-C  PCP: Meghan Hicks MD  Date of Admission:  07/31/19    Assessment/Plan:    Hospital Problem List:     Principal Problem:    Pneumonia  Active Problems:    Diabetes mellitus ([facility] [address] )    Hypertension    GERD (gastroesophageal reflux disease)    Chest pain      * Pneumonia  Assessment & Plan  Recurrent left sided pneumonia vs pneumonitis  High suspicion for aspiration pneumonitis from gerd  Pt has minimal s/sx and is a poor historian  -CT chest 4 weeks prior demonstrated recurrent pneumonia of b/l upper lobes, and left lower lobe with bibasilar bronchiectasis  It appears pt was only treated w/amoxicillin for this, by emr  -prior cxrs dating back from 03/20/19 demonstrate pneumonia with appropriate resolution in April and then recurrence in 06/2019 thereafter  -did travel to [address] [place] [address], but no other RF or s/sx of TB    Given interval resolution suspicion for malignancy is lower  -rec'd rocephin/azithromax in ed will continue and add flagyl     -Will check bedside swallow study to r/o orpharyngeal dysphagia but low suspicion for this clinically  -elevate hob/aspiration precautions,check legionella/strep pneumo urinary ag, procalcitonin now and in AM continue ppi/h2 blocker, likely needs op gi referral  -needs repeat CT chest in 6-8 weeks to demonstrate complete resolution of pna given recurrences    Chest pain  Assessment & Plan  Substernal, pleuritic, worse w/inspiration and lying down concerning for gastroesophagitis especially in setting of #1  Troponin negative  Continue op regimen of PPI/H2 blocker, trend troponins for completeness as well as serial ekgs    GERD (gastroesophageal reflux disease)  Assessment & Plan  Continue ppi/h2 blocker    Hypertension  Assessment & Plan  Continue cozaar    Diabetes mellitus ([facility] [address] )  Assessment & Plan  Lab Results   Component Value Date    HGBA1C 6 3 11/15/2018       No results for input(s): POCGLU in the last 72 hours  Blood Sugar Average: Last 72 hrs:  remotely at prediabetic ranges not on any medications  Repeat hgb a1c in am        VTE Prophylaxis: Heparin  / sequential compression device   Code Status: fc  POLST: There is no POLST form on file for this patient (pre-hospital)    Anticipated Length of Stay:  Patient will be admitted on an Inpatient basis with an anticipated length of stay of  Greater than 2 midnights  Justification for Hospital Stay: pna    Total Time for Visit, including Counseling / Coordination of Care: 45 minutes  Greater than 50% of this total time spent on direct patient counseling and coordination of care  Chief Complaint:   Chest pain/sob    History of Present Illness:    Kashmir Steiner is a 80 y o  female who presents with pmh of gerd, hyperthyroid, thyroid nodules, htn, dm, recurrent pneumonia coming to hospital for cp/sob  Pt is Slovak speaking only  converesation occurred between pt and myself in Slovak w/translation provided by pt's daughter at bedside  Pt is a poor historian  She reports earlier today substernal nonradiating CP worse w/lying down and w/inspiration w/associated SOB w/inspiration earlier today  She does suffer from reflux s/sx and is on pepcid/protonix for this  She has never had an EGD  She reports no sore throat, no n/v/f/c/rhinorrhea or sinus congestion  No trouble swallowing or coughing while eating  She reports her cough from prior pna has improved dramatically and is dry w/intermittent yellow sputum    Poor appetite with postprandial nausea and epigastric discomfort which is chronic  No unexplained weight loss or night sweats  No known exposures to TB but did travel recently to [facility] in 04/2019 for 1 week  Never smoker  She has been seen by her pcp for this multiple times and was diagnosed with recurrent pna   03/20/19 she had a cxr pa/lateral concerning for Left perihilar and left upper lobe infiltrate w/multiple small nodular opacities in CHRISTIAN of uncertain significance  She was dx'd with pna and was started on doxycycline for this  In )4/4/19 she had repeat cxr which demonstrated interval clearing of left upper lung opacities  In 6/21/19 she had CXR evidence concerning for recurrence of left perihilar and left Lower lung zone opacities concerning for recurrent pna w/bibasilar bronchiectasis  She was started on amoxicillin for this  In 07/2/19 she underwent CT chest no contrast w/concern for airspace and nodular opacities in CHRISTIAN w/tree-in-bud opacities in RUL and LLL concerning for multifocal pna  She was started on azithromax at this time on 7/5/19  Review of Systems:    Review of Systems   Constitutional: Positive for appetite change and fatigue  Negative for chills, fever and unexpected weight change  HENT: Negative for congestion, rhinorrhea and sore throat  Respiratory: Positive for shortness of breath  Negative for cough  Cardiovascular: Positive for chest pain  Negative for palpitations  Gastrointestinal: Positive for nausea  Negative for abdominal pain, diarrhea and vomiting  All other systems reviewed and are negative        Past Medical and Surgical History:     Past Medical History:   Diagnosis Date    Anemia     Arthritis     Diabetes ([address] )     Disease of thyroid gland     GERD (gastroesophageal reflux disease)     Hypertension     Pneumonia        Past Surgical History:   Procedure Laterality Date    COLONOSCOPY  09/08/2005    HYSTERECTOMY         Meds/Allergies:    Prior to Admission medications    Medication Sig Start Date End Date Taking? Authorizing Provider   acetaminophen (TYLENOL) 650 mg CR tablet take 1 tablet   by oral route  every 8 hours AS NEEDED FOR PAIN 5/24/17   Historical Provider, MD   albuterol (PROVENTIL HFA,VENTOLIN HFA) 90 mcg/act inhaler Inhale 2 puffs every 6 (six) hours as needed for wheezing 3/20/19   Phuc Kapadia PA-C   famotidine (PEPCID) 40 MG tablet  1/28/19   Historical Provider, MD   ferrous sulfate 324 (65 Fe) mg Take 1 tablet (324 mg total) by mouth 2 (two) times a day before meals 6/24/19   Caryle Brave, CRNP   losartan (COZAAR) 50 mg tablet Take 1 tablet (50 mg total) by mouth daily 2/25/19   Caryle Brave, CRNP   omeprazole (PriLOSEC) 20 mg delayed release capsule Take 1 capsule (20 mg total) by mouth daily 7/24/19   Griselda Oas, MD   traMADol-acetaminophen (ULTRACET) 37 5-325 mg per tablet Take 1 tablet by mouth every 8 (eight) hours as needed for moderate pain 5/13/19   Griselda Oas, MD   fluticasone Gilberto Green) 50 mcg/act nasal spray 1 spray into each nostril daily 5/13/19 7/31/19  Griselda Oas, MD     I have reviewed home medications with patient personally  Allergies: Allergies   Allergen Reactions    Pioglitazone Rash       Social History:     Marital Status:     Occupation:   Patient Pre-hospital Living Situation:   Patient Pre-hospital Level of Mobility:   Patient Pre-hospital Diet Restrictions:   Substance Use History:   Social History     Substance and Sexual Activity   Alcohol Use No     Social History     Tobacco Use   Smoking Status Never Smoker   Smokeless Tobacco Never Used     Social History     Substance and Sexual Activity   Drug Use No       Family History:    Family History   Problem Relation Age of Onset    Coronary artery disease Father        Physical Exam:     Vitals:   Blood Pressure: 125/62 (07/31/19 1643)  Pulse: 82 (07/31/19 1643)  Temperature: 99 4 °F (37 4 °C) (07/31/19 1439)  Temp Source: Temporal (07/31/19 1439)  Respirations: 17 (07/31/19 1643)  Weight - Scale: 80 kg (176 lb 5 9 oz) (07/31/19 1439)  SpO2: 97 % (07/31/19 1643)    Physical Exam   Constitutional: She appears well-developed and well-nourished  No distress  HENT:   Head: Normocephalic and atraumatic  Right Ear: External ear normal    Left Ear: External ear normal    Mouth/Throat: Oropharynx is clear and moist  No oropharyngeal exudate  Eyes: Conjunctivae are normal    Neck: Normal range of motion  Cardiovascular: Normal rate, regular rhythm and normal heart sounds  Exam reveals no gallop and no friction rub  No murmur heard  Pulmonary/Chest: Effort normal  No stridor  No respiratory distress  She has no wheezes  She has rales (?RLL rales, fine)  Positive anterior and posterior lower/mid lung field rhonchi   Abdominal: Soft  She exhibits no distension and no mass  There is no tenderness  There is no guarding  Musculoskeletal: She exhibits no edema  Lymphadenopathy:     She has no cervical adenopathy  Neurological: She is alert  Skin: Skin is warm and dry  She is not diaphoretic  Psychiatric: She has a normal mood and affect  Vitals reviewed  (  Be Sure to Include Physical Exam: Delete this entire line when you have entered your exam)    Additional Data:     Lab Results: I have personally reviewed pertinent reports  Results from last 7 days   Lab Units 07/31/19  1458   WBC Thousand/uL 6 06   HEMOGLOBIN g/dL 11 2*   HEMATOCRIT % 34 5*   PLATELETS Thousands/uL 255   NEUTROS PCT % 55   LYMPHS PCT % 19   MONOS PCT % 23*   EOS PCT % 1     Results from last 7 days   Lab Units 07/31/19  1457   POTASSIUM mmol/L 4 2   CHLORIDE mmol/L 101   CO2 mmol/L 26   BUN mg/dL 17   CREATININE mg/dL 1 01   CALCIUM mg/dL 9 6           Imaging: I have personally reviewed pertinent reports  cxr images reviewed by myself  There is concern for CHRISTIAN and left lower lung infiltrates  No RUL infiltrate noted  Formal read pending        Ct Chest Wo Contrast    Result Date: 7/5/2019  Narrative: CT CHEST WITHOUT IV CONTRAST INDICATION:   J18 9: Pneumonia, unspecified organism  COMPARISON:  Chest radiograph 6/21/2019 TECHNIQUE: CT examination of the chest was performed without intravenous contrast   Axial, sagittal, and coronal 2D reformatted images were created from the source data and submitted for interpretation  Radiation dose length product (DLP) for this visit:  179 mGy-cm   This examination, like all CT scans performed in the [facility], was performed utilizing techniques to minimize radiation dose exposure, including the use of iterative reconstruction and automated exposure control  FINDINGS: LUNGS:  There are nodular airspace opacities throughout the left upper lobe  There are scattered tree-in-bud opacities at the left lung base and within the right upper lobe  There is bibasilar bronchiectasis  Mild emphysematous changes  PLEURA:  Unremarkable  HEART/GREAT VESSELS:  The ascending aorta is enlarged measuring 4 cm at the level the right pulmonary artery  There is enlargement of the main pulmonary artery  The heart is normal in size  Trace pericardial effusion  MEDIASTINUM AND ARAMIS:  An enlarged nodular heterogeneous thyroid gland extends into the superior mediastinum  There are prominent bilateral hilar lymph nodes  Evaluation is limited secondary lack of intravenous contrast  CHEST WALL AND LOWER NECK:   There is an enlarged multinodular appearing thyroid gland with scattered calcifications extending into the superior mediastinum  VISUALIZED STRUCTURES IN THE UPPER ABDOMEN:  Small hiatal hernia  OSSEOUS STRUCTURES:  No acute fracture or destructive osseous lesion  Impression: 1  Airspace and nodular opacities most prominent in the left upper lobe with scattered tree-in-bud opacities in the right upper lobe and left lower lobe  Findings are suspicious for multifocal pneumonia    Recommend four-week follow-upCT following treatment to ensure resolution and exclude an underlying mass  2   Enlarged thyroid gland with heterogeneous nodules containing calcifications extending inferiorly into the superior mediastinum  Patient had prior thyroid ultrasound on 5/18/2016 and follow-up thyroid ultrasound could be considered to evaluate for interval enlargement  3   Mild enlargement of the ascending aorta measuring up to 4 cm at the level the right pulmonary artery  4   Enlargement of the main pulmonary artery which can be seen with pulmonary arterial hypertension  The study was marked in EPIC for significant notification  Workstation performed: [id number]       EKG, Pathology, and Other Studies Reviewed on Admission:   · EKG:     Allscripts / Epic Records Reviewed: Yes     ** Please Note: This note has been constructed using a voice recognition system   **

## 2019-07-31 NOTE — ASSESSMENT & PLAN NOTE
Lab Results   Component Value Date    HGBA1C 6 3 11/15/2018       No results for input(s): POCGLU in the last 72 hours      Blood Sugar Average: Last 72 hrs:  remotely at prediabetic ranges not on any medications  Repeat hgb a1c in am

## 2019-07-31 NOTE — PLAN OF CARE
Problem: Potential for Falls  Goal: Patient will remain free of falls  Description  INTERVENTIONS:  - Assess patient frequently for physical needs  -  Identify cognitive and physical deficits and behaviors that affect risk of falls  -  [place] fall precautions as indicated by assessment   - Educate patient/family on patient safety including physical limitations  - Instruct patient to call for assistance with activity based on assessment  - Modify environment to reduce risk of injury  - Consider OT/PT consult to assist with strengthening/mobility  Outcome: Progressing     Problem: Nutrition/Hydration-ADULT  Goal: Nutrient/Hydration intake appropriate for improving, restoring or maintaining nutritional needs  Description  Monitor and assess patient's nutrition/hydration status for malnutrition (ex- brittle hair, bruises, dry skin, pale skin and conjunctiva, muscle wasting, smooth red tongue, and disorientation)  Collaborate with interdisciplinary team and initiate plan and interventions as ordered  Monitor patient's weight and dietary intake as ordered or per policy  Utilize nutrition screening tool and intervene per policy  Determine patient's food preferences and provide high-protein, high-caloric foods as appropriate       INTERVENTIONS:  - Monitor oral intake, urinary output, labs, and treatment plans  - Assess nutrition and hydration status and recommend course of action  - Evaluate amount of meals eaten  - Assist patient with eating if necessary   - Allow adequate time for meals  - Recommend/ encourage appropriate diets, oral nutritional supplements, and vitamin/mineral supplements  - Order, calculate, and assess calorie counts as needed  - Recommend, monitor, and adjust tube feedings and TPN/PPN based on assessed needs  - Assess need for intravenous fluids  - Provide specific nutrition/hydration education as appropriate  - Include patient/family/caregiver in decisions related to nutrition  Outcome: Progressing     Problem: CARDIOVASCULAR - ADULT  Goal: Maintains optimal cardiac output and hemodynamic stability  Description  INTERVENTIONS:  - Monitor I/O, vital signs and rhythm  - Monitor for S/S and trends of decreased cardiac output i e  bleeding, hypotension  - Administer and titrate ordered vasoactive medications to optimize hemodynamic stability  - Assess quality of pulses, skin color and temperature  - Assess for signs of decreased coronary artery perfusion - ex   Angina  - Instruct patient to report change in severity of symptoms  Outcome: Progressing     Problem: RESPIRATORY - ADULT  Goal: Achieves optimal ventilation and oxygenation  Description  INTERVENTIONS:  - Assess for changes in respiratory status  - Assess for changes in mentation and behavior  - Position to facilitate oxygenation and minimize respiratory effort  - Oxygen administration by appropriate delivery method based on oxygen saturation (per order) or ABGs  - Initiate smoking cessation education as indicated  - Encourage broncho-pulmonary hygiene including cough, deep breathe, Incentive Spirometry  - Assess the need for suctioning and aspirate as needed  - Assess and instruct to report SOB or any respiratory difficulty  - Respiratory Therapy support as indicated  Outcome: Progressing

## 2019-07-31 NOTE — ASSESSMENT & PLAN NOTE
Recurrent left sided pneumonia vs pneumonitis  High suspicion for aspiration pneumonitis from gerd  Pt has minimal s/sx and is a poor historian  -CT chest 4 weeks prior demonstrated recurrent pneumonia of b/l upper lobes, and left lower lobe with bibasilar bronchiectasis  It appears pt was only treated w/amoxicillin for this, by emr  -prior cxrs dating back from 03/20/19 demonstrate pneumonia with appropriate resolution in April and then recurrence in 06/2019 thereafter  -did travel to [address] [address], but no other RF or s/sx of TB    Given interval resolution suspicion for malignancy is lower  -rec'd rocephin/azithromax in ed will continue and add flagyl     -Will check bedside swallow study to r/o orpharyngeal dysphagia but low suspicion for this clinically  -elevate hob/aspiration precautions,check legionella/strep pneumo urinary ag, procalcitonin now and in AM continue ppi/h2 blocker, likely needs op gi referral  -needs repeat CT chest in 6-8 weeks to demonstrate complete resolution of pna given recurrences

## 2019-07-31 NOTE — ED PROVIDER NOTES
History  Chief Complaint   Patient presents with    Shortness of Breath     Patient reports feeling short of breath and weak for the past 3 days  Just told family today and they alerted EMS  Patient also c/o of chest pressure  14-year-old female presenting to the emergency department for evaluation of shortness of breath and chest tightness present for the last 2 weeks, acutely worsening 2 hours prior to arrival   Patient reports that she was watching TV this afternoon on her couch when her shortness of breath suddenly became worse  She called her granddaughter who called 911 on her behalf  States that over the last 2 weeks the symptoms have been constant with exertion making them worse  She also believes that she feels worse when she sits up and better when she lays down  She has noted some mild nausea since her symptoms worsened today without any vomiting  Denies any abdominal pain or diarrhea  No urinary complaints  No recent fevers or chills coughs or colds  Notably does have a history of pneumonia this year treated as an outpatient  Prior to Admission Medications   Prescriptions Last Dose Informant Patient Reported?  Taking?   acetaminophen (TYLENOL) 650 mg CR tablet Past Month at Unknown time  Yes Yes   Sig: take 1 tablet   by oral route  every 8 hours AS NEEDED FOR PAIN   albuterol (PROVENTIL HFA,VENTOLIN HFA) 90 mcg/act inhaler More than a month at Unknown time  No No   Sig: Inhale 2 puffs every 6 (six) hours as needed for wheezing   famotidine (PEPCID) 40 MG tablet Not Taking at Unknown time  Yes No   ferrous sulfate 324 (65 Fe) mg 7/31/2019 at Unknown time  No Yes   Sig: Take 1 tablet (324 mg total) by mouth 2 (two) times a day before meals   losartan (COZAAR) 50 mg tablet 7/30/2019 at Unknown time  No Yes   Sig: Take 1 tablet (50 mg total) by mouth daily   Patient taking differently: Take 50 mg by mouth daily after dinner    omeprazole (PriLOSEC) 20 mg delayed release capsule 7/31/2019 at Unknown time  No Yes   Sig: Take 1 capsule (20 mg total) by mouth daily   traMADol-acetaminophen (ULTRACET) 37 5-325 mg per tablet 7/31/2019 at Unknown time  No Yes   Sig: Take 1 tablet by mouth every 8 (eight) hours as needed for moderate pain      Facility-Administered Medications: None       Past Medical History:   Diagnosis Date    Anemia     Arthritis     Diabetes ([address] )     Disease of thyroid gland     GERD (gastroesophageal reflux disease)     Hypertension     Pneumonia        Past Surgical History:   Procedure Laterality Date    COLONOSCOPY  09/08/2005    HYSTERECTOMY         Family History   Problem Relation Age of Onset    Coronary artery disease Father      I have reviewed and agree with the history as documented  Social History     Tobacco Use    Smoking status: Never Smoker    Smokeless tobacco: Never Used   Substance Use Topics    Alcohol use: No     Alcohol/week: 0 0 standard drinks     Frequency: Never     Drinks per session: Patient refused     Binge frequency: Never    Drug use: No        Review of Systems   Constitutional: Negative for chills and fever  HENT: Negative for congestion and sore throat  Eyes: Negative for visual disturbance  Respiratory: Positive for chest tightness and shortness of breath  Negative for cough  Cardiovascular: Negative for chest pain, palpitations and leg swelling  Gastrointestinal: Negative for abdominal pain, diarrhea, nausea and vomiting  Genitourinary: Negative for difficulty urinating and dysuria  Musculoskeletal: Negative for myalgias  Skin: Negative for rash  Neurological: Negative for dizziness, weakness, light-headedness, numbness and headaches         Physical Exam  ED Triage Vitals   Temperature Pulse Respirations Blood Pressure SpO2   07/31/19 1439 07/31/19 1434 07/31/19 1434 07/31/19 1434 07/31/19 1434   99 4 °F (37 4 °C) 90 16 143/66 97 %      Temp Source Heart Rate Source Patient Position - Orthostatic VS BP Location FiO2 (%)   07/31/19 1439 07/31/19 1643 07/31/19 1434 07/31/19 1434 --   Temporal Monitor Lying Right arm       Pain Score       07/31/19 1434       4             Orthostatic Vital Signs  Vitals:    07/31/19 1434 07/31/19 1643 07/31/19 1704   BP: 143/66 125/62 134/63   Pulse: 90 82 81   Patient Position - Orthostatic VS: Lying Lying Lying       Physical Exam   Constitutional: She is oriented to person, place, and time  She appears well-developed and well-nourished  No distress  HENT:   Head: Normocephalic and atraumatic  Eyes: Pupils are equal, round, and reactive to light  EOM are normal    Neck: Normal range of motion  Neck supple  No JVD present  Cardiovascular: Normal rate, regular rhythm and normal heart sounds  Exam reveals no gallop and no friction rub  No murmur heard  Pulmonary/Chest: Effort normal and breath sounds normal  No respiratory distress  Abdominal: Soft  Bowel sounds are normal  There is no tenderness  Musculoskeletal: Normal range of motion  Right lower leg: Normal         Left lower leg: Normal    Neurological: She is alert and oriented to person, place, and time  Skin: Skin is warm and dry  Capillary refill takes less than 2 seconds  Psychiatric: She has a normal mood and affect  Nursing note and vitals reviewed        ED Medications  Medications   acetaminophen (TYLENOL) tablet 650 mg (650 mg Oral Given 7/31/19 1746)   albuterol (PROVENTIL HFA,VENTOLIN HFA) inhaler 2 puff (has no administration in time range)   famotidine (PEPCID) tablet 40 mg (has no administration in time range)   ferrous sulfate tablet 325 mg (325 mg Oral Given 7/31/19 1747)   losartan (COZAAR) tablet 50 mg (has no administration in time range)   pantoprazole (PROTONIX) EC tablet 20 mg (has no administration in time range)   ondansetron (ZOFRAN) injection 4 mg (has no administration in time range)   heparin (porcine) subcutaneous injection 5,000 Units (5,000 Units Subcutaneous Given 7/31/19 2139)   cefTRIAXone (ROCEPHIN) 1,000 mg in dextrose 5 % 50 mL IVPB (has no administration in time range)     And   azithromycin (ZITHROMAX) tablet 500 mg (has no administration in time range)   metroNIDAZOLE (FLAGYL) IVPB (premix) 500 mg (500 mg Intravenous New Bag 7/31/19 1747)   aspirin chewable tablet 324 mg (324 mg Oral Given 7/31/19 1459)   ondansetron (ZOFRAN) injection 4 mg (4 mg Intravenous Given 7/31/19 1459)   ceftriaxone (ROCEPHIN) 1 g/50 mL in dextrose IVPB (0 mg Intravenous Stopped 7/31/19 1642)   azithromycin (ZITHROMAX) 500 mg in sodium chloride 0 9% 250mL IVPB 500 mg (500 mg Intravenous New Bag 7/31/19 1636)       Diagnostic Studies  Results Reviewed     Procedure Component Value Units Date/Time    Blood culture #2 [[id number]] Collected:  07/31/19 1542    Lab Status: In process Specimen:  Blood from Arm, Left Updated:  07/31/19 1548    Blood culture #1 [[id number]] Collected:  07/31/19 1540    Lab Status:   In process Specimen:  Blood from Arm, Right Updated:  07/31/19 1548    Urine Microscopic [[id number]]  (Abnormal) Collected:  07/31/19 1532    Lab Status:  Final result Specimen:  Urine, Other Updated:  07/31/19 1546     RBC, UA 10-20 /hpf      WBC, UA 0-1 /hpf      Epithelial Cells Occasional /hpf      Bacteria, UA Occasional /hpf     Troponin I [[id number]]  (Normal) Collected:  07/31/19 1458    Lab Status:  Final result Specimen:  Blood from Arm, Left Updated:  07/31/19 1526     Troponin I <0 02 ng/mL     POCT urinalysis dipstick [[id number]]  (Abnormal) Resulted:  07/31/19 1522    Lab Status:  Final result Specimen:  Urine Updated:  07/31/19 1522    ED Urine Macroscopic [[id number]]  (Abnormal) Collected:  07/31/19 1532    Lab Status:  Final result Specimen:  Urine Updated:  07/31/19 1519     Color, UA Yellow     Clarity, UA Clear     pH, UA 7 0     Leukocytes, UA Negative     Nitrite, UA Negative     Protein, UA Negative mg/dl      Glucose, UA Negative mg/dl      Ketones, UA Negative mg/dl Urobilinogen, UA 1 0 E U /dl      Bilirubin, UA Negative     Blood, UA Moderate     Specific [place], UA 1 010    Narrative:       CLINITEK RESULT    Basic metabolic panel [[id number]]  (Abnormal) Collected:  07/31/19 1457    Lab Status:  Final result Specimen:  Blood from Arm, Left Updated:  07/31/19 1516     Sodium 138 mmol/L      Potassium 4 2 mmol/L      Chloride 101 mmol/L      CO2 26 mmol/L      ANION GAP 11 mmol/L      BUN 17 mg/dL      Creatinine 1 01 mg/dL      Glucose 159 mg/dL      Calcium 9 6 mg/dL      eGFR 49 ml/min/1 73sq m     Narrative:       [place] guidelines for Chronic Kidney Disease (CKD):     Stage 1 with normal or high GFR (GFR > 90 mL/min/1 73 square meters)    Stage 2 Mild CKD (GFR = 60-89 mL/min/1 73 square meters)    Stage 3A Moderate CKD (GFR = 45-59 mL/min/1 73 square meters)    Stage 3B Moderate CKD (GFR = 30-44 mL/min/1 73 square meters)    Stage 4 Severe CKD (GFR = 15-29 mL/min/1 73 square meters)    Stage 5 End Stage CKD (GFR <15 mL/min/1 73 square meters)  Note: GFR calculation is accurate only with a steady state creatinine    CBC and differential [[id number]]  (Abnormal) Collected:  07/31/19 1458    Lab Status:  Final result Specimen:  Blood from Arm, Left Updated:  07/31/19 1509     WBC 6 06 Thousand/uL      RBC 3 78 Million/uL      Hemoglobin 11 2 g/dL      Hematocrit 34 5 %      MCV 91 fL      MCH 29 6 pg      MCHC 32 5 g/dL      RDW 14 1 %      MPV 11 2 fL      Platelets 432 Thousands/uL      nRBC 0 /100 WBCs      Neutrophils Relative 55 %      Immat GRANS % 1 %      Lymphocytes Relative 19 %      Monocytes Relative 23 %      Eosinophils Relative 1 %      Basophils Relative 1 %      Neutrophils Absolute 3 41 Thousands/µL      Immature Grans Absolute 0 05 Thousand/uL      Lymphocytes Absolute 1 12 Thousands/µL      Monocytes Absolute 1 39 Thousand/µL      Eosinophils Absolute 0 06 Thousand/µL      Basophils Absolute 0 03 Thousands/µL XR chest 2 views   ED Interpretation by Orlin Rodriguez MD (07/31 1528)   Persistent left lower lobe consolidation, worse today compared to previous  Final Result by Koen Morris DO (07/31 2011)      Persistent left upper lobe airspace disease suggestive of pneumonia  Continued follow-up is advised to document resolution  Workstation performed: [id number]               Procedures  Procedures        ED Course  ED Course as of Jul 31 2244 Wed Jul 31, 2019   1434 This EKG was interpreted by me  The EKG demonstrates Normal sinus rhythm, normal intervals and axis, normal QRS, no acute ST changes present                HEART Risk Score      Most Recent Value   History  1 Filed at: 07/31/2019 1459   ECG  0 Filed at: 07/31/2019 1459   Age  2 Filed at: 07/31/2019 1459   Risk Factors  2 Filed at: 07/31/2019 1459   Troponin  0 Filed at: 07/31/2019 1459   Heart Score Risk Calculator   History  1 Filed at: 07/31/2019 1459   ECG  0 Filed at: 07/31/2019 1459   Age  2 Filed at: 07/31/2019 1459   Risk Factors  2 Filed at: 07/31/2019 1459   Troponin  0 Filed at: 07/31/2019 1459   HEART Score  5 Filed at: 07/31/2019 1459   HEART Score  5 Filed at: 07/31/2019 1459                      Wells' Criteria for PE      Most Recent Value   Wells' Criteria for PE   Clinical signs and symptoms of DVT  0 Filed at: 07/31/2019 1454   PE is primary diagnosis or equally likely  0 Filed at: 07/31/2019 1454   HR >100  0 Filed at: 07/31/2019 1454   Immobilization at least 3 days or Surgery in the previous 4 weeks  0 Filed at: 07/31/2019 1454   Previous, objectively diagnosed PE or DVT  0 Filed at: 07/31/2019 1454   Hemoptysis  0 Filed at: 07/31/2019 1454   Malignancy with treatment within 6 months or palliative  0 Filed at: 07/31/2019 1454   Wells' Criteria Total  0 Filed at: 07/31/2019 1454            MDM  Number of Diagnoses or Management Options  Diagnosis management comments: 54-year-old female presenting emergency department for evaluation of chest tightness and shortness of breath worsened with exertion  Symptoms have been present for 2 weeks but acutely worsened 2 hours prior to arrival   Will do ACS rule out and chest x-ray to evaluate for lung pathology  Disposition  Final diagnoses:   SOB (shortness of breath)   Left lower lobe pneumonia ([address] )     Time reflects when diagnosis was documented in both MDM as applicable and the Disposition within this note     Time User Action Codes Description Comment    7/31/2019  3:45 PM Denita Anton Add [R06 02] SOB (shortness of breath)     7/31/2019  3:46 PM Denita Anton Add [J18 1] Left lower lobe pneumonia [facility])       ED Disposition     ED Disposition Condition Date/Time Comment    Admit Stable Wed Jul 31, 2019  3:45 PM Case was discussed with [place] and the patient's admission status was agreed to be Admission Status: inpatient status to the service of Dr Symone Cowan           Follow-up Information    None         Current Discharge Medication List      CONTINUE these medications which have NOT CHANGED    Details   acetaminophen (TYLENOL) 650 mg CR tablet take 1 tablet   by oral route  every 8 hours AS NEEDED FOR PAIN      ferrous sulfate 324 (65 Fe) mg Take 1 tablet (324 mg total) by mouth 2 (two) times a day before meals  Qty: 60 tablet, Refills: 2    Associated Diagnoses: Anemia, unspecified type      losartan (COZAAR) 50 mg tablet Take 1 tablet (50 mg total) by mouth daily  Qty: 90 tablet, Refills: 1    Comments: **Patient requests 90 days supply**  Associated Diagnoses: Essential hypertension, benign      omeprazole (PriLOSEC) 20 mg delayed release capsule Take 1 capsule (20 mg total) by mouth daily  Qty: 30 capsule, Refills: 1    Associated Diagnoses: Superficial gastritis without hemorrhage, unspecified chronicity      traMADol-acetaminophen (ULTRACET) 37 5-325 mg per tablet Take 1 tablet by mouth every 8 (eight) hours as needed for moderate pain  Qty: 60 tablet, Refills: 2 Associated Diagnoses: Primary osteoarthritis of both knees      albuterol (PROVENTIL HFA,VENTOLIN HFA) 90 mcg/act inhaler Inhale 2 puffs every 6 (six) hours as needed for wheezing  Qty: 1 Inhaler, Refills: 0    Associated Diagnoses: Pneumonia of left lower lobe due to infectious organism (HCC)      famotidine (PEPCID) 40 MG tablet Refills: 5           No discharge procedures on file  ED Provider  Attending physically available and evaluated Reather Major  I managed the patient along with the ED Attending      Electronically Signed by         Manish Castellon MD  07/31/19 6949

## 2019-08-01 VITALS
RESPIRATION RATE: 18 BRPM | DIASTOLIC BLOOD PRESSURE: 55 MMHG | WEIGHT: 162.26 LBS | BODY MASS INDEX: 28.75 KG/M2 | HEART RATE: 56 BPM | SYSTOLIC BLOOD PRESSURE: 117 MMHG | HEIGHT: 63 IN | TEMPERATURE: 97.7 F | OXYGEN SATURATION: 97 %

## 2019-08-01 LAB
ATRIAL RATE: 79 BPM
ATRIAL RATE: 81 BPM
ERYTHROCYTE [DISTWIDTH] IN BLOOD BY AUTOMATED COUNT: 14.2 % (ref 11.6–15.1)
GLUCOSE SERPL-MCNC: 112 MG/DL (ref 65–140)
HCT VFR BLD AUTO: 31.4 % (ref 34.8–46.1)
HGB BLD-MCNC: 10.1 G/DL (ref 11.5–15.4)
L PNEUMO1 AG UR QL IA.RAPID: NEGATIVE
MCH RBC QN AUTO: 29.9 PG (ref 26.8–34.3)
MCHC RBC AUTO-ENTMCNC: 32.2 G/DL (ref 31.4–37.4)
MCV RBC AUTO: 93 FL (ref 82–98)
P AXIS: 33 DEGREES
P AXIS: 84 DEGREES
PLATELET # BLD AUTO: 211 THOUSANDS/UL (ref 149–390)
PMV BLD AUTO: 10.9 FL (ref 8.9–12.7)
PR INTERVAL: 170 MS
PR INTERVAL: 176 MS
PROCALCITONIN SERPL-MCNC: <0.05 NG/ML
QRS AXIS: -21 DEGREES
QRS AXIS: -21 DEGREES
QRSD INTERVAL: 76 MS
QRSD INTERVAL: 80 MS
QT INTERVAL: 386 MS
QT INTERVAL: 398 MS
QTC INTERVAL: 448 MS
QTC INTERVAL: 456 MS
RBC # BLD AUTO: 3.38 MILLION/UL (ref 3.81–5.12)
S PNEUM AG UR QL: NEGATIVE
T WAVE AXIS: 16 DEGREES
T WAVE AXIS: 46 DEGREES
VENTRICULAR RATE: 79 BPM
VENTRICULAR RATE: 81 BPM
WBC # BLD AUTO: 6.84 THOUSAND/UL (ref 4.31–10.16)

## 2019-08-01 PROCEDURE — G8997 SWALLOW GOAL STATUS: HCPCS

## 2019-08-01 PROCEDURE — G8996 SWALLOW CURRENT STATUS: HCPCS

## 2019-08-01 PROCEDURE — 85027 COMPLETE CBC AUTOMATED: CPT | Performed by: PHYSICIAN ASSISTANT

## 2019-08-01 PROCEDURE — G8998 SWALLOW D/C STATUS: HCPCS

## 2019-08-01 PROCEDURE — 82948 REAGENT STRIP/BLOOD GLUCOSE: CPT

## 2019-08-01 PROCEDURE — 92610 EVALUATE SWALLOWING FUNCTION: CPT

## 2019-08-01 PROCEDURE — 84145 PROCALCITONIN (PCT): CPT | Performed by: PHYSICIAN ASSISTANT

## 2019-08-01 PROCEDURE — 99239 HOSP IP/OBS DSCHRG MGMT >30: CPT | Performed by: PHYSICIAN ASSISTANT

## 2019-08-01 PROCEDURE — 93010 ELECTROCARDIOGRAM REPORT: CPT | Performed by: INTERNAL MEDICINE

## 2019-08-01 RX ORDER — METRONIDAZOLE 500 MG/1
500 TABLET ORAL EVERY 8 HOURS
Status: DISCONTINUED | OUTPATIENT
Start: 2019-08-01 | End: 2019-08-01

## 2019-08-01 RX ORDER — ALBUTEROL SULFATE 90 UG/1
2 AEROSOL, METERED RESPIRATORY (INHALATION) EVERY 6 HOURS PRN
Qty: 1 INHALER | Refills: 0 | Status: SHIPPED | OUTPATIENT
Start: 2019-08-01 | End: 2020-06-11 | Stop reason: ALTCHOICE

## 2019-08-01 RX ORDER — ONDANSETRON 4 MG/1
4 TABLET, ORALLY DISINTEGRATING ORAL EVERY 6 HOURS PRN
Qty: 20 TABLET | Refills: 0 | Status: SHIPPED | OUTPATIENT
Start: 2019-08-01 | End: 2019-08-29 | Stop reason: ALTCHOICE

## 2019-08-01 RX ADMIN — FERROUS SULFATE TAB 325 MG (65 MG ELEMENTAL FE) 325 MG: 325 (65 FE) TAB at 08:26

## 2019-08-01 RX ADMIN — HEPARIN SODIUM 5000 UNITS: 5000 INJECTION INTRAVENOUS; SUBCUTANEOUS at 13:00

## 2019-08-01 RX ADMIN — LOSARTAN POTASSIUM 50 MG: 50 TABLET ORAL at 08:26

## 2019-08-01 RX ADMIN — METRONIDAZOLE 500 MG: 500 INJECTION, SOLUTION INTRAVENOUS at 01:46

## 2019-08-01 RX ADMIN — ACETAMINOPHEN 650 MG: 325 TABLET ORAL at 06:01

## 2019-08-01 RX ADMIN — HEPARIN SODIUM 5000 UNITS: 5000 INJECTION INTRAVENOUS; SUBCUTANEOUS at 06:02

## 2019-08-01 RX ADMIN — FAMOTIDINE 40 MG: 20 TABLET ORAL at 06:01

## 2019-08-01 RX ADMIN — PANTOPRAZOLE SODIUM 20 MG: 20 TABLET, DELAYED RELEASE ORAL at 06:01

## 2019-08-01 RX ADMIN — ACETAMINOPHEN 650 MG: 325 TABLET ORAL at 13:00

## 2019-08-01 RX ADMIN — METRONIDAZOLE 500 MG: 500 TABLET, FILM COATED ORAL at 10:48

## 2019-08-01 NOTE — ASSESSMENT & PLAN NOTE
Recurrent left sided aspiration pneumonitis  High suspicion for aspiration pneumonitis from gerd  Pt has minimal s/sx and is a poor historian  She had mild wheezing/rhonchi on exam but her s/sx have resolved  And she is on RA w/no fever or leukocytosis  -CT chest 4 weeks prior demonstrated recurrent pneumonia of b/l upper lobes, and left lower lobe with bibasilar bronchiectasis  -prior cxrs dating back from 03/20/19 demonstrate pneumonia with appropriate resolution in April and then recurrence in 06/2019 thereafter  -did travel to [address] [place] [address], but no other RF or s/sx of TB  Given interval resolution suspicion for malignancy is lower  -was started on tx for aspiration pneumonia but serial procalcitonin, legionella/strep pneumo ag are negative  -no evidence of oropharyngeal dysphagia from bedside swallow study  -elevate hob/aspiration precautions  D/w pt and pt's daughter at bedside precautions to minimize recurrence of s/sx  continue ppi/h2 blocker   May need eventual op GI eval   -needs repeat CT chest in 6-8 weeks to demonstrate complete resolution of pna given recurrences from this admission in mid september

## 2019-08-01 NOTE — UTILIZATION REVIEW
Initial Clinical Review    Admission: Date/Time/Statement: 7/31/19 @ 1547     Orders Placed This Encounter   Procedures    Inpatient Admission     Standing Status:   Standing     Number of Occurrences:   1     Order Specific Question:   Admitting Physician     Answer:   Richard Carolina [[id number]]     Order Specific Question:   Level of Care     Answer:   Med Surg [[id number]]     Order Specific Question:   Estimated length of stay     Answer:   More than 2 Midnights     Order Specific Question:   Certification     Answer:   I certify that inpatient services are medically necessary for this patient for a duration of greater than two midnights  See H&P and MD Progress Notes for additional information about the patient's course of treatment  ED Arrival Information     Expected Arrival Acuity Means of Arrival Escorted By Service Admission Type    - 7/31/2019 14:28 Urgent Ambulance [facility] EMS Hospitalist Urgent    Arrival Complaint    breathing trouble        Chief Complaint   Patient presents with    Shortness of Breath     Patient reports feeling short of breath and weak for the past 3 days  Just told family today and they alerted EMS  Patient also c/o of chest pressure  Assessment/Plan: Pneumonia  Assessment & Plan  Recurrent left sided pneumonia vs pneumonitis  High suspicion for aspiration pneumonitis from gerd  Pt has minimal s/sx and is a poor historian  -CT chest 4 weeks prior demonstrated recurrent pneumonia of b/l upper lobes, and left lower lobe with bibasilar bronchiectasis  It appears pt was only treated w/amoxicillin for this, by emr  -prior cxrs dating back from 03/20/19 demonstrate pneumonia with appropriate resolution in April and then recurrence in 06/2019 thereafter  -did travel to [address] [place] [address], but no other RF or s/sx of TB    Given interval resolution suspicion for malignancy is lower  -rec'd rocephin/azithromax in ed will continue and add flagyl     -Will check bedside swallow study to r/o orpharyngeal dysphagia but low suspicion for this clinically  -elevate hob/aspiration precautions,check legionella/strep pneumo urinary ag, procalcitonin now and in AM continue ppi/h2 blocker, likely needs op gi referral  -needs repeat CT chest in 6-8 weeks to demonstrate complete resolution of pna given recurrences     Chest pain  Assessment & Plan  Substernal, pleuritic, worse w/inspiration and lying down concerning for gastroesophagitis especially in setting of #1  Troponin negative  Continue op regimen of PPI/H2 blocker, trend troponins for completeness as well as serial ekgs     GERD (gastroesophageal reflux disease)  Assessment & Plan  Continue ppi/h2 blocker     Hypertension  Assessment & Plan  Continue cozaar  Diabetes mellitus ([facility] [address] )  Anticipated Length of Stay:  Patient will be admitted on an Inpatient basis with an anticipated length of stay of  Greater than 2 midnights  Justification for Hospital Stay: pna    Nimo Bhat is a 80 y o  female who presents with pmh of gerd, hyperthyroid, thyroid nodules, htn, dm, recurrent pneumonia coming to hospital for cp/sob  Pt is Yi speaking only  converesation occurred between pt and myself in Yi w/translation provided by pt's daughter at bedside  Pt is a poor historian  She reports earlier today substernal nonradiating CP worse w/lying down and w/inspiration w/associated SOB w/inspiration earlier today  She does suffer from reflux s/sx and is on pepcid/protonix for this  She has never had an EGD       She reports no sore throat, no n/v/f/c/rhinorrhea or sinus congestion  No trouble swallowing or coughing while eating  She reports her cough from prior pna has improved dramatically and is dry w/intermittent yellow sputum  Poor appetite with postprandial nausea and epigastric discomfort which is chronic  No unexplained weight loss or night sweats  No known exposures to TB but did travel recently to [facility] in 04/2019 for 1 week    Never smoker      She has been seen by her pcp for this multiple times and was diagnosed with recurrent pna   03/20/19 she had a cxr pa/lateral concerning for Left perihilar and left upper lobe infiltrate w/multiple small nodular opacities in CHRISTIAN of uncertain significance  She was dx'd with pna and was started on doxycycline for this  In )4/4/19 she had repeat cxr which demonstrated interval clearing of left upper lung opacities      In 6/21/19 she had CXR evidence concerning for recurrence of left perihilar and left Lower lung zone opacities concerning for recurrent pna w/bibasilar bronchiectasis  She was started on amoxicillin for this        In 07/2/19 she underwent CT chest no contrast w/concern for airspace and nodular opacities in CHRISTIAN w/tree-in-bud opacities in RUL and LLL concerning for multifocal pna    She was started on azithromax at this time on 7/5/19               ED Triage Vitals   Temperature Pulse Respirations Blood Pressure SpO2   07/31/19 1439 07/31/19 1434 07/31/19 1434 07/31/19 1434 07/31/19 1434   99 4 °F (37 4 °C) 90 16 143/66 97 %      Temp Source Heart Rate Source Patient Position - Orthostatic VS BP Location FiO2 (%)   07/31/19 1439 07/31/19 1643 07/31/19 1434 07/31/19 1434 --   Temporal Monitor Lying Right arm       Pain Score       07/31/19 1434       4        Wt Readings from Last 1 Encounters:   07/31/19 73 6 kg (162 lb 4 1 oz)     Additional Vital Signs:   08/01/19 0718  97 5 °F (36 4 °C)  80  18  117/57  98 %  None (Room air)  Lying   07/31/19 1704  98 4 °F (36 9 °C)  81  18  134/63  98 %  None (Room air)  Lying   07/31/19 1643  --  82  17  125/62  97 %  None (Room air)  Lying   07/31/19 1439  99 4 °F (37 4 °C)  --  --  --  --  --  --   07/31/19 1434  --  90  16  143/66  97 %  --  Lying              Pertinent Labs/Diagnostic Test Results:   Results from last 7 days   Lab Units 08/01/19  0602 07/31/19  1835 07/31/19  1458   WBC Thousand/uL 6 84  --  6 06   HEMOGLOBIN g/dL 10 1*  --  11 2* HEMATOCRIT % 31 4*  --  34 5*   PLATELETS Thousands/uL 211 234 255   NEUTROS ABS Thousands/µL  --   --  3 41         Results from last 7 days   Lab Units 07/31/19  1457   SODIUM mmol/L 138   POTASSIUM mmol/L 4 2   CHLORIDE mmol/L 101   CO2 mmol/L 26   ANION GAP mmol/L 11   BUN mg/dL 17   CREATININE mg/dL 1 01   EGFR ml/min/1 73sq m 49   CALCIUM mg/dL 9 6             Results from last 7 days   Lab Units 07/31/19  1457   GLUCOSE RANDOM mg/dL 159*           Results from last 7 days   Lab Units 07/31/19  2205 07/31/19  1835 07/31/19  1458   TROPONIN I ng/mL <0 02 <0 02 <0 02                 Results from last 7 days   Lab Units 07/31/19  1835   PROCALCITONIN ng/ml <0 05               Results from last 7 days   Lab Units 07/31/19  1532   CLARITY UA  Clear   COLOR UA  Yellow   SPEC GRAV UA  1 010   PH UA  7 0   GLUCOSE UA mg/dl Negative   KETONES UA mg/dl Negative   BLOOD UA  Moderate*   PROTEIN UA mg/dl Negative   NITRITE UA  Negative   BILIRUBIN UA  Negative   UROBILINOGEN UA E U /dl 1 0   LEUKOCYTES UA  Negative   WBC UA /hpf 0-1*   RBC UA /hpf 10-20*   BACTERIA UA /hpf Occasional   EPITHELIAL CELLS WET PREP /hpf Occasional     Results from last 7 days   Lab Units 07/31/19  1841   STREP PNEUMONIAE ANTIGEN, URINE  Negative   LEGIONELLA URINARY ANTIGEN  Negative          CXR:      CHRISTIAN  PNA  ED Treatment:   Medication Administration from 07/31/2019 1428 to 07/31/2019 1648       Date/Time Order Dose Route Action Action by Comments     07/31/2019 1459 aspirin chewable tablet 324 mg 324 mg Oral Given Raúl Smoker, RN      07/31/2019 1459 ondansetron (ZOFRAN) injection 4 mg 4 mg Intravenous Given Neita Smoker, RN      07/31/2019 1642 ceftriaxone (ROCEPHIN) 1 g/50 mL in dextrose IVPB 0 mg Intravenous Stopped Neita Smoker, RN      07/31/2019 1546 ceftriaxone (ROCEPHIN) 1 g/50 mL in dextrose IVPB 1,000 mg Intravenous New Bag Neita Smoker, RN      07/31/2019 1636 azithromycin (ZITHROMAX) 500 mg in sodium chloride 0 9% 250mL IVPB 500 mg 500 mg Intravenous New Bag Arnaldo Purdy RN         Past Medical History:   Diagnosis Date    Anemia     Arthritis     [facility]     Disease of thyroid gland     GERD (gastroesophageal reflux disease)     Hypertension     Pneumonia      Present on Admission:   Hypertension   Diabetes mellitus ([facility] )      Admitting Diagnosis: SOB (shortness of breath) [R06 02]  Trouble breathing [R06 89]  Left lower lobe pneumonia ([facility] ) [J18 1]  Age/Sex: 80 y o  female  Admission Orders:    Current Facility-Administered Medications:  acetaminophen 650 mg Oral Q6H [facility] Freida Simons PA-C   albuterol 2 puff Inhalation Q6H PRN Freida Simons PA-C   cefTRIAXone 1,000 mg Intravenous Q24H Freida Simons PA-C   And       azithromycin 500 mg Oral Q24H Freida Simons PA-C   famotidine 40 mg Oral Daily Before Breakfast Freida Simons PA-C   ferrous sulfate 325 mg Oral BID With Meals Freida Simons PA-C   heparin (porcine) 5,000 Units Subcutaneous Q8H [facility] Freida Simons PA-C   losartan 50 mg Oral Daily Freida Simons PA-C   metroNIDAZOLE 500 mg Oral Q8H Freida Simons PA-C   ondansetron 4 mg Intravenous Q6H PRN Freida Simons PA-C   pantoprazole 20 mg Oral Early Morning Freida Simons PA-C       None   Aspiration precautions  Sputum  c/s    Network Utilization Review Department  Phone: [phone]; Fax [phone]  [email or web address]  ATTENTION: Please call with any questions or concerns to [phone]  and carefully listen to the prompts so that you are directed to the right person  Send all requests for admission clinical reviews, approved or denied determinations and any other requests to fax [phone]   All voicemails are confidential

## 2019-08-01 NOTE — PROGRESS NOTES
The metronidazole has been converted to Oral per [facility] IV-to-PO Auto-Conversion Protocol for Adults as approved by the Pharmacy and Therapeutics Committee  The patient met all eligible criteria:  3 Age = 25years old   2) Received at least one dose of the IV form   3) Receiving at least one other scheduled oral/enteral medication   4) Tolerating an oral/enteral diet   and did not have any exclusions:   1) Critical care patient   2) Active GI bleed (IF assessing H2RAs or PPIs)   3) Continuous tube feeding (IF assessing cipro, doxycycline, levofloxacin, minocycline, rifampin, or voriconazole)   4) Receiving PO vancomycin (IF assessing metronidazole)   5) Persistent nausea and/or vomiting   6) Ileus or gastrointestinal obstruction   7) Aravind/nasogastric tube set for continuous suction   8) Specific order not to automatically convert to PO (in the order's comments or if discussed in the most recent Infectious Disease or primary team's progress notes)

## 2019-08-01 NOTE — SOCIAL WORK
LOS1  Admission: not a readmission/bundle patient  Reason for admission: pneumonia/SOB  Met patient and patient's family in order to discuss discharge  Plans  Patient is Kiswahili speaking only  Patient was alert and oriented times 3  Patient lives alone, family assist with meal preparation, transport, house chores etc   Patient uses a walker  Lives on a first floor apartment, no steps  Patient is under medical care with Dr Hoa Quiroz and uses [email or web address] [place], [place]  Patient's family will assist with transport at d/c  Patient is requesting non medical home care services, case will referred t [facility] for welfare check and to explore options for [employer] and benefits  No other issues reported by patient and/or family  This worker to follow  CM reviewed d/c planning process including the following: identifying help at home, patient preference for d/c planning needs  [facility] Meds to Bed program, availability of treatment team to discuss questions or concerns patient and/or family may have regarding understanding medications and recognizing signs and symptoms once discharged  CM also encouraged patient to follow up with all recommended appointments after discharge  Patient advised of importance for patient and family to participate in managing patients medical well being

## 2019-08-01 NOTE — NURSING NOTE
Reviewed discharge instructions with patient and family  Discussed follow ups and home meds  Patient demonstrated understanding  Will continue to monitor until patient is discharged

## 2019-08-01 NOTE — SPEECH THERAPY NOTE
Speech Language/Pathology  Speech/Language Pathology  Assessment    Patient Name: Geoffrey Aragon  Today's Date: 8/1/2019     Problem List  Patient Active Problem List   Diagnosis    Benign essential hypertension    Diabetes mellitus ([facility] )    Hyperlipidemia    Hypertension    Hyperthyroidism    Thyrotoxicosis    Chronic left-sided low back pain with left-sided sciatica    Bradycardia    Pneumonia of left lower lobe due to infectious organism ([facility] )    Uncontrolled diabetes mellitus type 2 without complications (HCC)    Allergic rhinitis    GERD (gastroesophageal reflux disease)    Pneumonia    Chest pain     Past Medical History  Past Medical History:   Diagnosis Date    Anemia     Arthritis     [facility]     Disease of thyroid gland     GERD (gastroesophageal reflux disease)     Hypertension     Pneumonia      Past Surgical History  Past Surgical History:   Procedure Laterality Date    COLONOSCOPY  09/08/2005    HYSTERECTOMY       Chief Complaint:   Chest pain/sob     History of Present Illness:     Geoffrey Aragon is a 80 y o  female who presents with pmh of gerd, hyperthyroid, thyroid nodules, htn, dm, recurrent pneumonia coming to hospital for cp/sob  Pt is Portuguese speaking only  converesation occurred between pt and myself in Portuguese w/translation provided by pt's daughter at bedside  Pt is a poor historian  She reports earlier today substernal nonradiating CP worse w/lying down and w/inspiration w/associated SOB w/inspiration earlier today  She does suffer from reflux s/sx and is on pepcid/protonix for this  She has never had an EGD       She reports no sore throat, no n/v/f/c/rhinorrhea or sinus congestion  No trouble swallowing or coughing while eating  She reports her cough from prior pna has improved dramatically and is dry w/intermittent yellow sputum  Poor appetite with postprandial nausea and epigastric discomfort which is chronic    No unexplained weight loss or night sweats  No known exposures to TB but did travel recently to [facility] in 04/2019 for 1 week  Never smoker      She has been seen by her pcp for this multiple times and was diagnosed with recurrent pna   03/20/19 she had a cxr pa/lateral concerning for Left perihilar and left upper lobe infiltrate w/multiple small nodular opacities in CHRISTIAN of uncertain significance  She was dx'd with pna and was started on doxycycline for this  In )4/4/19 she had repeat cxr which demonstrated interval clearing of left upper lung opacities      In 6/21/19 she had CXR evidence concerning for recurrence of left perihilar and left Lower lung zone opacities concerning for recurrent pna w/bibasilar bronchiectasis  She was started on amoxicillin for this        In 07/2/19 she underwent CT chest no contrast w/concern for airspace and nodular opacities in CHRISTIAN w/tree-in-bud opacities in RUL and LLL concerning for multifocal pna  She was started on azithromax at this time on 7/5/19  CT chest 7/2/19:  IMPRESSION:  1  Airspace and nodular opacities most prominent in the left upper lobe with scattered tree-in-bud opacities in the right upper lobe and left lower lobe  Findings are suspicious for multifocal pneumonia  Recommend four-week follow-upCT following   treatment to ensure resolution and exclude an underlying mass  2   Enlarged thyroid gland with heterogeneous nodules containing calcifications extending inferiorly into the superior mediastinum  Patient had prior thyroid ultrasound on 5/18/2016 and follow-up thyroid ultrasound could be considered to evaluate for   interval enlargement  3   Mild enlargement of the ascending aorta measuring up to 4 cm at the level the right pulmonary artery  4   Enlargement of the main pulmonary artery which can be seen with pulmonary arterial hypertension      CT  Chest lvh 5/3/16:  Impression[de-identified] Marked thyroid enlargement with left-sided substernal goiter with  mass effect on the trachea and great vessels  No evidence of pulmonary emboli  Cardiomegaly and atherosclerotic changes  Small hiatal hernia  Bedside Swallow Evaluation:    Summary:  Pt presents w/ oral and pharyngeal stages that are WNL  Family report pt eats quickly and generally does not drink in between bites  Pt is denying any diffiuclty  Ate fish, veggies, mashed potatoes, and thin liquids for lunch      Recommendations:  Diet: regular  Liquid:thin  Meds:as tolerated  Supervision: as necessary  Positioning:Upright  Strategies: Pt to take PO/Meds only when fully alert and upright  Elevate hob at night  Oral care  Reflux precautions: recommended family have hob elevated at night  Eval only, No f/u tx indicated       Patient's goal:none stated    Consider consult w/:  GI  Reason for consult:  R/o aspiration  Determine safest and least restrictive diet  Current diet:  regular  Premorbid diet[de-identified]  regular  Previous VBS:  none  O2 requirement:  none  Voice/Speech:  wnl in Divehi  Social:  Lives w/ supportive family  Follows commands:  yes                      Cognitive Status:  alert  [facility]  Dentition:dentures/wnl  Labial strength and ROM:wnl  Lingual strength and ROM:wnl  Mandibular strength and ROM:wnl  Secretion management:wnl    Items administered:  Noted above  Oral stage: wnl  Lip closure:+  Mastication:+  Bolus formation:+  Bolus control:+  Transfer:+  Oral residue:-  Pocketing:-    Pharyngeal stage:wnl  Swallow promptness:+  Laryngeal rise:+  Wet voice:-  Throat clear:-  Cough:-  Secondary swallows:-  Audible swallows:-  No overt s/s aspiration    Esophageal stage:  H/o GERD  H/o hiatal hernia    Results d/w:  Pt, nursing, family, physician

## 2019-08-01 NOTE — DISCHARGE INSTRUCTIONS
You were found to have an aspiration pneumonitis and bronchitis  This does not appear to be a bacterial infection and likely is inflammatory and less likely viral due to reflux  You had no evidence of swallowing problems with speech therapy  There was no evidence of heart attack given the complaint of chest pain/upper stomach pain  Please continue to elevate the head of your bed while sleeping to greater than 45 degrees  Please discuss with your primary care doctor to have a second CT (cat) scan obtained 6-8 weeks from now (mid to end of September) to demonstrate complete resolution of the pneumonia in the left upper lung as well as the recent evidence of pneumonia in Right upper lung and left lower lung prior to her admission  She should avoid spicy foods, tomatoes, caffeine, citrus to minimize her symptoms of heartburn  If her symptoms continue to cause her significant discomfort she may need a referral by her primary doctor to a GI specialist         Neumonítis por aspiración   LO QUE NECESITA SABER:   ¿Qué es la neumonítis por aspiración? La neumonía por aspiración es la inflamcción pulmonar que se desarrolla después de aspirar (inhalar) alimentos, líquidos o vómito en los pulmones  También puede aspirar alimentos o líquidos de groves estómago que retroceden [employer]  Si usted no puede toser y expulsar el material aspirado, existe la posibilidad de que las bacterias crezcan en saurav pulmones y causen mulu infección  ¿Qué aumenta mi riesgo de tener neumonía por aspiración? Groves riesgo es más elevado si usted es mayor de 75 años de edad o vive en un hogar de ancianos o un centro de cuidados prolongados  Es posible que groves nivel de actividad disminuya a medida que envejece, o que deba guardar cama  Es posible que usted no pueda tragar o toser dion   Los siguientes factores también aumentan el riesgo de neumonía por aspiración:  · Debilidad de los músculos que nos ayudan a tragar los alimentos debido a un derrame cerebral, la enfermedad de Alzheimer u otras enfermedades    · Un sistema inmunitario debilitado por diabetes, EPOC, insuficiencia cardíaca u otros problemas de david    · Fumar cigarrillos    · Uso de mulu sonda alimenticia o ventilador que permite que las bacterias entren a saurav pulmones    · [place] o tratamiento de radiación para combatir un cáncer de kulwant o carson    · Sunita higiene oral, pérdida de los dientes o uso de dentadura postiza    · Alcoholismo o uso de drogas por vía intravenosa  ¿Cuáles son los signos y síntomas de la neumonía/neumonitis por aspiración? · [place] o escalofríos     · Tos, con o sin flema     · Esputo (flema) de color schneider o espumoso    · Piel de color azulado alrededor de la boca o en la punta de los dedos    · Dificultad para tragar    · Falta de aliento, respiración rápida o ruidosa     · Dolor en el pecho o ritmo cardíaco acelerado    · Confusión, cansancio o cambios en groves capacidad de estar alerta     · Cambios en la voz, lynette gorgoteos y ronquera     · Pérdida del apetito o de peso  ¿Cómo se diagnostica la neumonía por aspiración? Es común aspirar alimentos y líquidos sin saberlo  Groves médico podría diagnosticarle neumonía por aspiración si usted presenta síntomas y anteriormente ha tenido dificultad para tragar  Le preguntará acerca de saurav síntomas y cuándo comenzaron  L[place] Julio Cesar el interior de la boca y la garganta y le auscultará el corazón y los pulmones  Groves médico le pedirá que hable y tosa mientras escucha  Infórmele de cualquier problema de david que usted tenga y de los medicamentos que ada  Es posible que usted necesite hacerse alguno de los siguientes estudios:  · Los análisis de rick:  Se hacen análisis de sangrepara comprobar si groves conteo de glóbulos blancos es elevado  La [place] puede ser un signo de infección  · Mulu ingesta de bario  podría indicar si usted tiene dificultad para tragar a nati plazo   Groves médico observará mientras usted traga distintos tipos de alimentos y líquidos  Es probable que se le pida que tome un líquido espeso, llamado bario [employer] médicos ollie radiografías de groves garganta, esófago y pulmones  · Un cultivo de esputo  se puede analizar para comprobar la presencia de bacterias que producen neumonía  Groves médico podría pedirle que escupa la flema en mulu taza o podría succionar la flema de groves garganta  · Radiografía o tomografía computarizada  podrían mostrar si existe mulu lesión o signos de infección pulmonar, lynette inflamación y líquido en saurav pulmones  Es posible que le administren un líquido de contraste antes de la TC para que el médico louis mejor las imágenes  Informe a groves médico si alguna vez tuvo mulu reacción alérgica a un medio de [place]  ¿Cómo se trata la neumonía por aspiración? Es posible que usted necesite alguno de los siguientes:  · Antibióticos  para tratar la neumonía causada por mulu bacteria  Se le pueden administrar antibióticos en pastillas o por vía intravenosa  · Esteroides  sirven para reducir la inflamación en groves cerebro  · Es posible que usted necesite oxígeno adicional  si el nivel de oxígeno en groves rick esta más bajo de lo que debería estar  Es posible que le administren oxígeno a través de mulu máscarilla colocada sobre la nariz y la boca o a través de pequeños tubos colocados en las fosas nasales  Pregúntele a groves médico antes de [employer] o los tubos del oxígeno  ¿Qué puede hacer para prevenir o manejar la neumonía por aspiración? · Asista a las sesiones de terapia del habla lynette le indiquen  Un terapeuta del habla puede enseñarle ejercicios para fortalecer los músculos que usted [place] para tragar  · Siéntese mientras come  Si debe guardar cama, mantenga la cabecera de la cama un poco elevada (aproximadamente a un ángulo de 30° a 45°) mientras come  Coma bocados pequeños, coma lentamente y trague con el mentón hacia abajo       · Coma alimentos blandos o tome líquidos espesados  Un dietista puede enseñarle a espesar los líquidos para que tenga menos dificultad al tragar  [employer] líquidos con mulu pajita o popote, o use mulu cuchara para sorberlos lentamente  Consulte con fletcher dietista qué tipo de alimentos debe comer  Le puede sugerir alimentos blandos, lynette cereales cocidos, pasta, frutas y verduras dion codidas y huevos revueltos  Fletcher dietista también puede sugerirle carne húmeda y tierna cortada en trozos pequeños  · Cuide de saurav dientes y fletcher boca  La higiene bucal puede ayudar a eliminar las bacterias dañinas de la boca para que usted no las aspire  Siéntese y [employer] dientes a diario ariadne 2 minutos después de desayunar y otra vez después de cenar  Cepíllese la lengua  Si usted no tiene dientes, cepíllese [employer] con un cepillo de cerdas suaves  Si tiene [place], [place] y límpiela con un cepillo de dientes eléctrico y agua después del desayuno y la ash  [place] la dentadura postiza en remojo en mulu solución de limpieza ariadne la noche  Visite regularmente al odontólogo para que le onel mulu limpieza de dientes y encías  · Limite o evite el uso de sedantes  Estos medicamentos aumentan el riesgo de aspiración porque le secan la boca y le provocan somnolencia  Si es posible, evite ana medicamentos antihistamínicos, porque también causan sequedad en la boca  · No fume  La nicotina y otras sustancias químicas que contienen los cigarrillos y cigarros pueden dañar los pulmones  Pida información a fletcher médico si usted actualmente fuma y necesita ayuda para dejar de fumar  Los cigarrillos electrónicos o tabaco sin humo todavía contienen nicotina  Consulte con fletcher médico antes de [employer]  ¿Cuándo paige buscar atención inmediata? · Usted tiene dolor en el pecho  · Usted está confundido o no puede pensar con claridad       · Usted tiene más dificultad para respirar o fletcher respiración parece más rápida de lo normal   ¿Cuándo paige comunicarme con mi médico?   · Usted tiene fiebre  · Edda síntomas no mejoran después de 2 o 3 días de [place]  · Usted tiene preguntas o inquietudes acerca de galeano condición o cuidado  ACUERDOS SOBRE GALEANO CUIDADO:   Usted tiene el derecho de ayudar a planear galeano cuidado  Aprenda todo lo que pueda sobre galeano condición y lynette darle tratamiento  Discuta edda opciones de tratamiento con edda médicos para decidir el cuidado que usted desea recibir  Usted siempre tiene el derecho de rechazar el tratamiento  Esta información es sólo para uso en educación  Galeano intención no es darle un consejo médico sobre enfermedades o tratamientos  Colsulte con galeano [employer] farmacéutico antes de seguir cualquier régimen médico para saber si es seguro y efectivo para usted  © 2017 [address] Information is for End User's use only and may not be sold, redistributed or otherwise used for commercial purposes  All illustrations and images included in CareNotes® are the copyrighted property of [employer] , [employer]  or Man Kidd

## 2019-08-01 NOTE — ASSESSMENT & PLAN NOTE
Substernal, pleuritic, worse w/inspiration and lying down concerning for gastroesophagitis especially in setting of #1  Troponin negatives for acs and now resolved  Continue op regimen of PPI/H2 blocker, dietary modifications recommended

## 2019-08-01 NOTE — ASSESSMENT & PLAN NOTE
Lab Results   Component Value Date    HGBA1C 6 3 11/15/2018       No results for input(s): POCGLU in the last 72 hours      Blood Sugar Average: Last 72 hrs:  remotely at prediabetic ranges not on any medications

## 2019-08-01 NOTE — DISCHARGE SUMMARY
Discharge- Chris aVlle 6/14/1928, 80 y o  female MRN: [id number]    Unit/Bed#: E5 -01 Encounter: [id number]    Primary Care Provider: Camelia Hernandez MD   Date and time admitted to hospital: 7/31/2019  2:28 PM    Discharge Summary - [facility]    Patient Information: Chris Valle 80 y o  female MRN: [id number]  Unit/Bed#: E5 -01 Encounter: [id number]    Discharging Physician / Practitioner: Robin Pineda PA-C  PCP: Camelia Hernandez MD  Admission Date: 7/31/2019  Discharge Date: 08/01/19    Disposition:     Home    Reason for Admission: pneumonia/pneumonitis    Discharge Diagnoses:     Principal Problem:    Pneumonia  Active Problems:    Diabetes mellitus ([address] )    Hypertension    GERD (gastroesophageal reflux disease)    Chest pain  Resolved Problems:    * No resolved hospital problems  *      Consultations During Hospital Stay:  · slp     Procedures Performed:     · Bedside swallow study wnl    Significant Findings / Test Results:   CXR left upper lobe persistent airspace disease concerning for pneumonia  Serial procalcitonins negative  Legionella/strep pneumo urinary ag negative  · acs negative by trops    Incidental Findings:   ·      Test Results Pending at Discharge (will require follow up):   ·      Outpatient Tests Requested:  ·     Complications:      Hospital Course:     Chris Valle is a 80 y o  female patient who originally presented to the hospital on 7/31/2019 due to  female who presents with pmh of gerd, hyperthyroid, thyroid nodules, htn, dm, recurrent pneumonia coming to hospital for cp/sob  Pt is Scottish speaking only  converesation occurred between pt and myself in Scottish w/translation provided by pt's daughter at bedside  Pt is a poor historian  She reports earlier today substernal nonradiating CP worse w/lying down and w/inspiration w/associated SOB w/inspiration earlier today    She does suffer from reflux s/sx and is on pepcid/protonix for this  She has never had an EGD       She reports no sore throat, no n/v/f/c/rhinorrhea or sinus congestion  No trouble swallowing or coughing while eating  She reports her cough from prior pna has improved dramatically and is dry w/intermittent yellow sputum  Poor appetite with postprandial nausea and epigastric discomfort which is chronic  No unexplained weight loss or night sweats  No known exposures to TB but did travel recently to [facility] in 04/2019 for 1 week  Never smoker      She has been seen by her pcp for this multiple times and was diagnosed with recurrent pna   03/20/19 she had a cxr pa/lateral concerning for Left perihilar and left upper lobe infiltrate w/multiple small nodular opacities in CHRISTIAN of uncertain significance  She was dx'd with pna and was started on doxycycline for this  In )4/4/19 she had repeat cxr which demonstrated interval clearing of left upper lung opacities      In 6/21/19 she had CXR evidence concerning for recurrence of left perihilar and left Lower lung zone opacities concerning for recurrent pna w/bibasilar bronchiectasis  She was started on amoxicillin for this        In 07/2/19 she underwent CT chest no contrast w/concern for airspace and nodular opacities in CHRISTIAN w/tree-in-bud opacities in RUL and LLL concerning for multifocal pna  She was started on azithromax at this time on 7/5/19  Hospital stay by problem list below  * Pneumonia  Assessment & Plan  Recurrent left sided aspiration pneumonitis  High suspicion for aspiration pneumonitis from gerd  Pt has minimal s/sx and is a poor historian  She had mild wheezing/rhonchi on exam but her s/sx have resolved  And she is on RA w/no fever or leukocytosis  -CT chest 4 weeks prior demonstrated recurrent pneumonia of b/l upper lobes, and left lower lobe with bibasilar bronchiectasis      -prior cxrs dating back from 03/20/19 demonstrate pneumonia with appropriate resolution in April and then recurrence in 06/2019 thereafter  -did travel to \[facility]", but no other RF or s/sx of TB  Given interval resolution suspicion for malignancy is lower  -was started on tx for aspiration pneumonia but serial procalcitonin, legionella/strep pneumo ag are negative  -no evidence of oropharyngeal dysphagia from bedside swallow study  -elevate hob/aspiration precautions  D/w pt and pt's daughter at bedside precautions to minimize recurrence of s/sx  continue ppi/h2 blocker  May need eventual op GI eval   -needs repeat CT chest in 6-8 weeks to demonstrate complete resolution of pna given recurrences from this admission in mid september    Pt did improve markedly quicker than expected and is safe for d/c home in good condition  She will be discharged sooner than anticipated  Chest pain  Assessment & Plan  Substernal, pleuritic, worse w/inspiration and lying down concerning for gastroesophagitis especially in setting of #1  Troponin negatives for acs and now resolved  Continue op regimen of PPI/H2 blocker, dietary modifications recommended    GERD (gastroesophageal reflux disease)  Assessment & Plan  Continue ppi/h2 blocker    Hypertension  Assessment & Plan  Continue cozaar    Diabetes mellitus ([facility] [address] )  Assessment & Plan  Lab Results   Component Value Date    HGBA1C 6 3 11/15/2018       No results for input(s): POCGLU in the last 72 hours  Blood Sugar Average: Last 72 hrs:  remotely at prediabetic ranges not on any medications            Condition at Discharge: good     Discharge Day Visit / Exam:     Subjective:  Pt seen and examined  No events overnight per nursing did well with breakfast   No further epigastric/cp  No sob  Still minimal cough per daughter  No fevers/chills  She is independent with ADLs at baseline and does use a walker but has family assistance w/meal prep transport and house chores w/no steps into home or within home        Vitals: Blood Pressure: 117/57 (08/01/19 0718)  Pulse: 80 (08/01/19 8189)  Temperature: 97 5 °F (36 4 °C) (08/01/19 0718)  Temp Source: Tympanic (08/01/19 0718)  Respirations: 18 (08/01/19 0718)  Height: 5' 3" (160 cm) (08/01/19 1409)  Weight - Scale: 73 6 kg (162 lb 4 1 oz) (07/31/19 1828)  SpO2: 98 % (08/01/19 0718)  Exam:   Physical Exam   Constitutional: She appears well-developed and well-nourished  No distress  HENT:   Head: Normocephalic and atraumatic  Right Ear: External ear normal    Left Ear: External ear normal    Mouth/Throat: No oropharyngeal exudate  Eyes: Conjunctivae are normal    Neck: Normal range of motion  Cardiovascular: Normal rate, regular rhythm and normal heart sounds  Exam reveals no gallop and no friction rub  No murmur heard  Pulmonary/Chest: Effort normal  No stridor  No respiratory distress  She has wheezes (mild expiratory fine wheezing/rhonchi)  Abdominal: Soft  She exhibits no distension and no mass  There is no tenderness  There is no guarding  Musculoskeletal: She exhibits no edema  Neurological: She is alert  Skin: Skin is warm and dry  She is not diaphoretic  Psychiatric: She has a normal mood and affect  Vitals reviewed  (  Be Sure to Include Physical Exam: Delete this entire line when you have entered your exam)  Discussion with Family: dispo workup f/u needs    Discharge instructions/Information to patient and family:   See after visit summary for information provided to patient and family  Provisions for Follow-Up Care:  See after visit summary for information related to follow-up care and any pertinent home health orders  Planned Readmission:  none     Discharge Statement:  I spent 45 minutes discharging the patient  This time was spent on the day of discharge  I had direct contact with the patient on the day of discharge   Greater than 50% of the total time was spent examining patient, answering all patient questions, arranging and discussing plan of care with patient as well as directly providing post-discharge instructions  Additional time then spent on discharge activities  Discharge Medications:  See after visit summary for reconciled discharge medications provided to patient and family        ** Please Note: This note has been constructed using a voice recognition system **

## 2019-08-05 LAB
BACTERIA BLD CULT: NORMAL
BACTERIA BLD CULT: NORMAL

## 2019-08-07 ENCOUNTER — TELEPHONE (OUTPATIENT)
Dept: FAMILY MEDICINE CLINIC | Facility: CLINIC | Age: 84
End: 2019-08-07

## 2019-08-07 ENCOUNTER — DOCUMENTATION (OUTPATIENT)
Dept: MEDSURG UNIT | Facility: HOSPITAL | Age: 84
End: 2019-08-07

## 2019-08-07 NOTE — TELEPHONE ENCOUNTER
employee health called stated there has been an exposure in the office from this patient  She needs a call back from

## 2019-08-07 NOTE — LETTER
August 7, 2019     Yovani Gleason  [address]  [address] [facility]    Patient: Yovani Gleason   YOB: 1928         To Whom It May Concern,    Yovani Gleason has been evaluated multiple time this year for her recurrent Pneumonia in the office  She was recently seen at [facility] 7/31/2019-8/1/2019 for Shortness of Breath and Chest pain  She is currently admitted at [facility] diagnosed with Pulmonary TB and under treatment with Department of Health  Her other chronic conditions are Diabetes Mellitus Type II, Hypertension, Hyperthyroidism, Anemia, Coronary Artery Disease and Pneumonia left lung due to infectious organisms  If you have questions, please do not hesitate to call me  I look forward to following your patient along with you           Sincerely,        Ester Bueno MD

## 2019-08-21 DIAGNOSIS — I10 BENIGN ESSENTIAL HYPERTENSION: Primary | ICD-10-CM

## 2019-08-22 ENCOUNTER — TRANSITIONAL CARE MANAGEMENT (OUTPATIENT)
Dept: FAMILY MEDICINE CLINIC | Facility: CLINIC | Age: 84
End: 2019-08-22

## 2019-08-22 DIAGNOSIS — I10 BENIGN ESSENTIAL HYPERTENSION: ICD-10-CM

## 2019-08-22 RX ORDER — BLOOD PRESSURE TEST KIT
KIT MISCELLANEOUS 2 TIMES DAILY
Qty: 1 EACH | Refills: 0 | Status: SHIPPED | OUTPATIENT
Start: 2019-08-22 | End: 2019-08-22 | Stop reason: SDUPTHER

## 2019-08-23 ENCOUNTER — TELEPHONE (OUTPATIENT)
Dept: FAMILY MEDICINE CLINIC | Facility: CLINIC | Age: 84
End: 2019-08-23

## 2019-08-23 RX ORDER — BLOOD PRESSURE TEST KIT
KIT MISCELLANEOUS 2 TIMES DAILY
Qty: 1 EACH | Refills: 0 | Status: SHIPPED | OUTPATIENT
Start: 2019-08-23 | End: 2019-08-30 | Stop reason: SDUPTHER

## 2019-08-23 NOTE — TELEPHONE ENCOUNTER
Dottie Phoenix from [facility]  called to inform Dr Celeste Mobley that patient is now under their services for Nursing and Physical Therapy services  Patient was in the hospital for syncope and Active TB  Patient medication Losartan was changed from 50 mg to 25 mg  He states that the health bureau is following up with the TB

## 2019-08-28 RX ORDER — PANTOPRAZOLE SODIUM 40 MG/1
40 TABLET, DELAYED RELEASE ORAL
COMMUNITY
Start: 2019-08-08 | End: 2019-08-29 | Stop reason: ALTCHOICE

## 2019-08-28 RX ORDER — ETHAMBUTOL HYDROCHLORIDE 400 MG/1
1200 TABLET, FILM COATED ORAL DAILY
COMMUNITY
Start: 2019-08-08 | End: 2019-09-07

## 2019-08-28 RX ORDER — PYRIDOXINE HCL (VITAMIN B6) 50 MG
50 TABLET ORAL DAILY
COMMUNITY
Start: 2019-08-08 | End: 2020-06-04 | Stop reason: ALTCHOICE

## 2019-08-28 RX ORDER — GUAIFENESIN 600 MG
600 TABLET, EXTENDED RELEASE 12 HR ORAL 2 TIMES DAILY
COMMUNITY
Start: 2019-08-07 | End: 2019-08-29 | Stop reason: ALTCHOICE

## 2019-08-28 RX ORDER — PYRAZINAMIDE TABLET 500 MG/1
1500 TABLET ORAL DAILY
COMMUNITY
Start: 2019-08-08 | End: 2019-09-07

## 2019-08-28 RX ORDER — ISONIAZID 300 MG/1
300 TABLET ORAL DAILY
COMMUNITY
Start: 2019-08-08 | End: 2019-09-07

## 2019-08-28 RX ORDER — TRAMADOL HYDROCHLORIDE 50 MG/1
50 TABLET ORAL EVERY 6 HOURS PRN
COMMUNITY
Start: 2019-08-07 | End: 2019-08-29 | Stop reason: SDUPTHER

## 2019-08-28 RX ORDER — LOSARTAN POTASSIUM 25 MG/1
TABLET ORAL
Refills: 0 | COMMUNITY
Start: 2019-08-21 | End: 2019-11-12 | Stop reason: SDUPTHER

## 2019-08-29 ENCOUNTER — OFFICE VISIT (OUTPATIENT)
Dept: FAMILY MEDICINE CLINIC | Facility: CLINIC | Age: 84
End: 2019-08-29
Payer: MEDICARE

## 2019-08-29 ENCOUNTER — TELEPHONE (OUTPATIENT)
Dept: FAMILY MEDICINE CLINIC | Facility: CLINIC | Age: 84
End: 2019-08-29

## 2019-08-29 VITALS
SYSTOLIC BLOOD PRESSURE: 150 MMHG | HEART RATE: 104 BPM | OXYGEN SATURATION: 93 % | DIASTOLIC BLOOD PRESSURE: 60 MMHG | TEMPERATURE: 98 F | HEIGHT: 63 IN | BODY MASS INDEX: 28.88 KG/M2 | WEIGHT: 163 LBS | RESPIRATION RATE: 16 BRPM

## 2019-08-29 DIAGNOSIS — R26.9 GAIT DISTURBANCE: ICD-10-CM

## 2019-08-29 DIAGNOSIS — IMO0001 UNCONTROLLED DIABETES MELLITUS TYPE 2 WITHOUT COMPLICATIONS: ICD-10-CM

## 2019-08-29 DIAGNOSIS — I10 BENIGN ESSENTIAL HYPERTENSION: ICD-10-CM

## 2019-08-29 DIAGNOSIS — Z23 NEED FOR PNEUMOCOCCAL VACCINE: ICD-10-CM

## 2019-08-29 DIAGNOSIS — A15.0 PULMONARY TUBERCULOSIS: Primary | ICD-10-CM

## 2019-08-29 DIAGNOSIS — M17.0 BILATERAL PRIMARY OSTEOARTHRITIS OF KNEE: ICD-10-CM

## 2019-08-29 PROCEDURE — 99495 TRANSJ CARE MGMT MOD F2F 14D: CPT | Performed by: FAMILY MEDICINE

## 2019-08-29 PROCEDURE — G0009 ADMIN PNEUMOCOCCAL VACCINE: HCPCS | Performed by: FAMILY MEDICINE

## 2019-08-29 PROCEDURE — 90670 PCV13 VACCINE IM: CPT | Performed by: FAMILY MEDICINE

## 2019-08-29 RX ORDER — LANCING DEVICE
1 EACH MISCELLANEOUS 3 TIMES DAILY
Qty: 1 DEVICE | Refills: 0 | Status: SHIPPED | OUTPATIENT
Start: 2019-08-29 | End: 2019-08-30 | Stop reason: SDUPTHER

## 2019-08-29 NOTE — LETTER
August 29, 2019     Yovani   [address]  [address]  [place] [address]    Patient: Yovani    YOB: 1928           Bilateral Large open soft knee brace with adjustable Velcro closure     DX:  Bilateral Osteoarthritis of knee M17 0              Ester Bueno MD  NPI [id number]

## 2019-08-29 NOTE — PROGRESS NOTES
Assessment/Plan:    Benign essential hypertension  Blood pressure well control for age, systolic blood pressure 267 is inspected number for this elderly of 80year-old  Will continue same treatment  Gait disturbance  Patient has severe osteoarthritis knees bilaterally  She is in the need for assistant home in to prevent falling in shower we are requesting a long toilet seat with extension into a tube  Bilateral primary osteoarthritis of knee  No candidate for any procedure, to continue taking Tylenol as needed pain  Pulmonary tuberculosis  Patient will continue under care of infection Disease for tuberculosis, I did not find any side effect of current medications  Uncontrolled diabetes mellitus type 2 without complications ([facility] )  Lab Results   Component Value Date    HGBA1C 6 3 11/15/2018     She has well controlled diabetes will continue the same  Diagnoses and all orders for this visit:    Pulmonary tuberculosis    Need for pneumococcal vaccine  -     PNEUMOCOCCAL CONJUGATE VACCINE 13-VALENT GREATER THAN 6 MONTHS    Benign essential hypertension  -     Discontinue: Blood Pressure Monitor KIT; by Does not apply route 2 (two) times a day Check blood pressure twice daily  -     Blood Pressure Monitor KIT; by Does not apply route 2 (two) times a day Check blood pressure twice daily    Uncontrolled diabetes mellitus type 2 without complications ([facility])  -     Blood Glucose Monitoring Suppl (PHARMACIST CHOICE MINI SYSTEM) MARVIN; 1 Device by Does not apply route 3 (three) times a day  -     glucose blood (PHARMACIST CHOICE NO CODING) test strip; Check blood sugar 3 times a day    Gait disturbance  -     Shower chair  -     Transfer Benches    Bilateral primary osteoarthritis of knee  -     Transfer Benches          Subjective:      Patient ID: Amarilis Lai is a 80 y o  female  Patient currently on active treatment for pulmonary tuberculosis    fourr medications are more, she does not complain of any pain or other side effects from either INH, rifampin and ethambutol and Pyrazinamide  TCM Call (since 8/3/2019)     Date and time call was made  8/27/2019  2:10 PM    Hospital care reviewed  Records reviewed    [facility]    Date of Admission  08/18/19    Date of discharge  08/21/19    Diagnosis  Shortness of breath, weakness    Disposition  Home    Were the patients medications reviewed and updated  No    Current Symptoms  Weakness    Weakness severity  Mild      TCM Call (since 8/3/2019)     Post hospital issues  None    Should patient be enrolled in anticoag monitoring? No    Scheduled for follow up? Yes    Did you obtain your prescribed medications  Yes    Do you need help managing your prescriptions or medications  No    Is transportation to your appointment needed  No    I have advised the patient to call PCP with any new or worsening symptoms  Vicki Root, Practice Administrator         The following portions of the patient's history were reviewed and updated as appropriate: allergies, current medications, past family history, past medical history, past social history, past surgical history and problem list     Review of Systems   Constitutional: Positive for fatigue  Respiratory: Negative for shortness of breath  Cardiovascular: Negative for chest pain, palpitations and leg swelling  Gastrointestinal: Positive for constipation  Endocrine: Negative  Genitourinary: Negative  Musculoskeletal: Positive for arthralgias, back pain, myalgias and neck stiffness  Skin: Negative  Neurological: Positive for dizziness, weakness and numbness  Hematological: Negative  Psychiatric/Behavioral: Positive for sleep disturbance  Negative for suicidal ideas  The patient is nervous/anxious            Objective:      /60 (BP Location: Left arm, Patient Position: Sitting, Cuff Size: Standard)   Pulse 104   Temp 98 °F (36 7 °C) (Oral)   Resp 16   Ht 5' 3" (1 6 m)   Wt 73 9 kg (163 lb)   SpO2 93%   Breastfeeding? No   BMI 28 87 kg/m²          Physical Exam   HENT:   Nose: Nose normal    Mouth/Throat: Oropharynx is clear and moist  No oropharyngeal exudate  Eyes: Pupils are equal, round, and reactive to light  EOM are normal  Right eye exhibits no discharge  Left eye exhibits no discharge  No scleral icterus  Cardiovascular: Normal rate, regular rhythm, normal heart sounds and intact distal pulses  Exam reveals no friction rub  No murmur heard  Pulmonary/Chest: Effort normal  No respiratory distress  She has no wheezes  She has no rales  She exhibits no tenderness  Musculoskeletal: Normal range of motion  She exhibits tenderness (Chronic knee arthritis bilaterally)  Neurological: She is alert  Skin: Skin is warm and dry  No rash noted  No erythema  Psychiatric: She has a normal mood and affect  Her behavior is normal    Nursing note and vitals reviewed

## 2019-08-30 DIAGNOSIS — IMO0001 UNCONTROLLED DIABETES MELLITUS TYPE 2 WITHOUT COMPLICATIONS: ICD-10-CM

## 2019-08-30 RX ORDER — BLOOD PRESSURE TEST KIT
KIT MISCELLANEOUS 2 TIMES DAILY
Qty: 1 EACH | Refills: 0 | Status: SHIPPED | OUTPATIENT
Start: 2019-08-30 | End: 2019-08-30 | Stop reason: SDUPTHER

## 2019-08-30 RX ORDER — BLOOD PRESSURE TEST KIT
KIT MISCELLANEOUS 2 TIMES DAILY
Qty: 1 EACH | Refills: 0 | Status: SHIPPED | OUTPATIENT
Start: 2019-08-30 | End: 2020-09-09 | Stop reason: ALTCHOICE

## 2019-09-03 PROBLEM — A15.0 PULMONARY TUBERCULOSIS: Status: ACTIVE | Noted: 2019-09-03

## 2019-09-03 PROBLEM — R26.9 GAIT DISTURBANCE: Status: ACTIVE | Noted: 2019-09-03

## 2019-09-03 PROBLEM — M17.0 BILATERAL PRIMARY OSTEOARTHRITIS OF KNEE: Status: ACTIVE | Noted: 2019-09-03

## 2019-09-03 RX ORDER — LANCING DEVICE
1 EACH MISCELLANEOUS 3 TIMES DAILY
Qty: 1 DEVICE | Refills: 0 | Status: SHIPPED | OUTPATIENT
Start: 2019-09-03 | End: 2020-01-15

## 2019-09-03 NOTE — ASSESSMENT & PLAN NOTE
Patient has severe osteoarthritis knees bilaterally  She is in the need for assistant home in to prevent falling in shower we are requesting a long toilet seat with extension into a tube

## 2019-09-03 NOTE — ASSESSMENT & PLAN NOTE
Blood pressure well control for age, systolic blood pressure 941 is inspected number for this elderly of 80year-old  Will continue same treatment

## 2019-09-03 NOTE — ASSESSMENT & PLAN NOTE
Lab Results   Component Value Date    HGBA1C 6 3 11/15/2018     She has well controlled diabetes will continue the same

## 2019-09-03 NOTE — ASSESSMENT & PLAN NOTE
Patient will continue under care of infection Disease for tuberculosis, I did not find any side effect of current medications

## 2019-09-04 ENCOUNTER — TELEPHONE (OUTPATIENT)
Dept: FAMILY MEDICINE CLINIC | Facility: CLINIC | Age: 84
End: 2019-09-04

## 2019-09-04 NOTE — TELEPHONE ENCOUNTER
Trevon Streeter from [facility] suggests the patient needs at least two more home physical theraphy visit, she will be faxing an order for Dr Yang to approve   Any questions she can be reached at [phone]

## 2019-09-09 DIAGNOSIS — K29.30 SUPERFICIAL GASTRITIS WITHOUT HEMORRHAGE, UNSPECIFIED CHRONICITY: ICD-10-CM

## 2019-09-09 RX ORDER — OMEPRAZOLE 20 MG/1
20 CAPSULE, DELAYED RELEASE ORAL DAILY
Qty: 30 CAPSULE | Refills: 1 | Status: SHIPPED | OUTPATIENT
Start: 2019-09-09 | End: 2019-10-28 | Stop reason: ALTCHOICE

## 2019-09-09 NOTE — LETTER
September 9, 2019     Kashmir Steiner  [address]  [facility] [address]    Patient: Kashmir Steiner   YOB: 1928           Bilateral hinge knee brace large      DX:  Bilateral Osteoarthritis of knee M17 0            Napoleon Feliciano MD   [id number]  NPI: [id number]

## 2019-09-30 ENCOUNTER — OFFICE VISIT (OUTPATIENT)
Dept: FAMILY MEDICINE CLINIC | Facility: CLINIC | Age: 84
End: 2019-09-30
Payer: MEDICARE

## 2019-09-30 ENCOUNTER — APPOINTMENT (OUTPATIENT)
Dept: LAB | Facility: HOSPITAL | Age: 84
End: 2019-09-30
Payer: MEDICARE

## 2019-09-30 VITALS
DIASTOLIC BLOOD PRESSURE: 88 MMHG | HEART RATE: 100 BPM | OXYGEN SATURATION: 95 % | SYSTOLIC BLOOD PRESSURE: 130 MMHG | RESPIRATION RATE: 20 BRPM | TEMPERATURE: 99.4 F

## 2019-09-30 DIAGNOSIS — Z23 FLU VACCINE NEED: ICD-10-CM

## 2019-09-30 DIAGNOSIS — L03.119 CELLULITIS OF FOOT: Primary | ICD-10-CM

## 2019-09-30 DIAGNOSIS — E79.0 HYPERURICEMIA: ICD-10-CM

## 2019-09-30 DIAGNOSIS — IMO0001 UNCONTROLLED DIABETES MELLITUS TYPE 2 WITHOUT COMPLICATIONS: ICD-10-CM

## 2019-09-30 DIAGNOSIS — D64.9 ANEMIA, UNSPECIFIED TYPE: ICD-10-CM

## 2019-09-30 LAB
CHOLEST SERPL-MCNC: 211 MG/DL
EST. AVERAGE GLUCOSE BLD GHB EST-MCNC: 137 MG/DL
FERRITIN SERPL-MCNC: 64 NG/ML (ref 8–388)
HBA1C MFR BLD: 6.4 % (ref 4.2–6.3)
HDLC SERPL-MCNC: 84 MG/DL (ref 40–59)
IRON SATN MFR SERPL: 10 %
IRON SERPL-MCNC: 29 UG/DL (ref 50–170)
LDLC SERPL CALC-MCNC: 111 MG/DL
NONHDLC SERPL-MCNC: 127 MG/DL
TIBC SERPL-MCNC: 284 UG/DL (ref 250–450)
TRIGL SERPL-MCNC: 82 MG/DL
URATE SERPL-MCNC: 14.2 MG/DL (ref 2.7–7.5)

## 2019-09-30 PROCEDURE — 90662 IIV NO PRSV INCREASED AG IM: CPT | Performed by: FAMILY MEDICINE

## 2019-09-30 PROCEDURE — 82728 ASSAY OF FERRITIN: CPT

## 2019-09-30 PROCEDURE — 83540 ASSAY OF IRON: CPT

## 2019-09-30 PROCEDURE — 80061 LIPID PANEL: CPT

## 2019-09-30 PROCEDURE — 83550 IRON BINDING TEST: CPT

## 2019-09-30 PROCEDURE — 83036 HEMOGLOBIN GLYCOSYLATED A1C: CPT

## 2019-09-30 PROCEDURE — 84550 ASSAY OF BLOOD/URIC ACID: CPT

## 2019-09-30 PROCEDURE — G0008 ADMIN INFLUENZA VIRUS VAC: HCPCS | Performed by: FAMILY MEDICINE

## 2019-09-30 PROCEDURE — 36415 COLL VENOUS BLD VENIPUNCTURE: CPT

## 2019-09-30 PROCEDURE — 99214 OFFICE O/P EST MOD 30 MIN: CPT | Performed by: FAMILY MEDICINE

## 2019-09-30 RX ORDER — PANTOPRAZOLE SODIUM 40 MG/1
40 TABLET, DELAYED RELEASE ORAL
COMMUNITY
Start: 2019-08-08 | End: 2019-10-28 | Stop reason: ALTCHOICE

## 2019-09-30 RX ORDER — CEPHALEXIN 500 MG/1
500 TABLET ORAL 3 TIMES DAILY
Qty: 21 TABLET | Refills: 0 | Status: SHIPPED | OUTPATIENT
Start: 2019-09-30 | End: 2019-10-07

## 2019-09-30 RX ORDER — ETHAMBUTOL HYDROCHLORIDE 400 MG/1
1200 TABLET, FILM COATED ORAL DAILY
COMMUNITY
Start: 2019-08-08 | End: 2020-06-04 | Stop reason: ALTCHOICE

## 2019-09-30 RX ORDER — PYRAZINAMIDE TABLET 500 MG/1
1500 TABLET ORAL DAILY
COMMUNITY
Start: 2019-08-08 | End: 2020-06-04 | Stop reason: ALTCHOICE

## 2019-09-30 NOTE — PROGRESS NOTES
Assessment/Plan:  1  Cellulitis of foot  Specifically 2nd and 3er right toes   Differential dx is with gout, to check uric acid  - Cephalexin 500 MG tablet; Take 1 tablet (500 mg total) by mouth 3 (three) times a day for 7 days  Dispense: 21 tablet; Refill: 0    2  Flu vaccine need  Flu immunization was given to patient and educated regarding the benefits and the side effects     - influenza vaccine, 2911-8805, high-dose, PF 0 5 mL (FLUZONE HIGH-DOSE)    3  Hyperuricemia  Can be caused by Ethambutol and pyrazinamide  - Uric acid; Future    No problem-specific Assessment & Plan notes found for this encounter  Diagnoses and all orders for this visit:    Cellulitis of foot  -     Cephalexin 500 MG tablet; Take 1 tablet (500 mg total) by mouth 3 (three) times a day for 7 days    Flu vaccine need  -     influenza vaccine, 3671-9182, high-dose, PF 0 5 mL (FLUZONE HIGH-DOSE)    Other orders  -     ethambutol (MYAMBUTOL) 400 mg tablet; Take 1,200 mg by mouth daily  -     pantoprazole (PROTONIX) 40 mg tablet; Take 40 mg by mouth  -     pyrazinamide (TEBRAZID) 500 mg tablet; Take 1,500 mg by mouth daily          Subjective:      Patient ID: Leny Brock is a 80 y o  female  Edema and redness of right 2nd and 3er toes  Started 3 days ago  Patient is current been treated for Active pulmonary TB  Medication she takes were reviewed  She is on Pyrazinamide, INH, Ethambutol and Ryfampicin  The following portions of the patient's history were reviewed and updated as appropriate: allergies, current medications, past family history, past medical history, past social history, past surgical history and problem list     Review of Systems   Constitutional: Negative for fatigue, fever and unexpected weight change  HENT: Negative for congestion, ear pain, postnasal drip, rhinorrhea, sinus pressure, sinus pain, sneezing, sore throat and trouble swallowing  Eyes: Negative for photophobia     Respiratory: Positive for cough and shortness of breath  Negative for chest tightness and wheezing  Cardiovascular: Negative for chest pain, palpitations and leg swelling  Gastrointestinal: Negative for abdominal pain, blood in stool, nausea and vomiting  Genitourinary: Negative for hematuria  Musculoskeletal: Positive for arthralgias (or right toes)  Neurological: Negative for dizziness, syncope, light-headedness and headaches  Hematological: Does not bruise/bleed easily  Objective:      /88   Pulse 100   Temp 99 4 °F (37 4 °C) (Oral)   Resp 20   SpO2 95%          Physical Exam   HENT:   Nose: Nose normal    Mouth/Throat: Oropharynx is clear and moist  No oropharyngeal exudate  Eyes: Pupils are equal, round, and reactive to light  EOM are normal  Right eye exhibits no discharge  Left eye exhibits no discharge  No scleral icterus  Cardiovascular: Normal rate, regular rhythm, normal heart sounds and intact distal pulses  Exam reveals no friction rub  No murmur heard  Pulmonary/Chest: Effort normal  No respiratory distress  She has no wheezes  She has no rales  She exhibits no tenderness  Musculoskeletal: Normal range of motion  Neurological: She is alert  Skin: Skin is warm and dry  No rash noted  There is erythema (and edema of 2nd and 3er right toes  )  Psychiatric: She has a normal mood and affect  Her behavior is normal    Nursing note and vitals reviewed

## 2019-10-01 DIAGNOSIS — M10.272 ACUTE DRUG-INDUCED GOUT INVOLVING TOE OF LEFT FOOT: Primary | ICD-10-CM

## 2019-10-01 RX ORDER — COLCHICINE 0.6 MG/1
0.6 TABLET ORAL DAILY
Qty: 30 TABLET | Refills: 5 | Status: SHIPPED | OUTPATIENT
Start: 2019-10-01 | End: 2020-03-17 | Stop reason: SDUPTHER

## 2019-10-01 NOTE — PROGRESS NOTES
She has elevation of Uric acid above 14  Colchicine is the drug of choice, after controlling attach to decrease uric acid which will be challenging with 2 drugs in board causing hyperuricemia: Ethambutol and pyrazinamide

## 2019-10-12 ENCOUNTER — APPOINTMENT (OUTPATIENT)
Dept: LAB | Age: 84
End: 2019-10-12
Payer: MEDICARE

## 2019-10-12 ENCOUNTER — TRANSCRIBE ORDERS (OUTPATIENT)
Dept: ADMINISTRATIVE | Facility: HOSPITAL | Age: 84
End: 2019-10-12

## 2019-10-12 DIAGNOSIS — Z79.899 ENCOUNTER FOR LONG-TERM (CURRENT) USE OF OTHER MEDICATIONS: Primary | ICD-10-CM

## 2019-10-12 DIAGNOSIS — Z79.899 ENCOUNTER FOR LONG-TERM (CURRENT) USE OF OTHER MEDICATIONS: ICD-10-CM

## 2019-10-12 LAB
ALBUMIN SERPL BCP-MCNC: 3.5 G/DL (ref 3.5–5)
ALP SERPL-CCNC: 75 U/L (ref 46–116)
ALT SERPL W P-5'-P-CCNC: 20 U/L (ref 12–78)
ANION GAP SERPL CALCULATED.3IONS-SCNC: 9 MMOL/L (ref 4–13)
AST SERPL W P-5'-P-CCNC: 22 U/L (ref 5–45)
BILIRUB SERPL-MCNC: 0.44 MG/DL (ref 0.2–1)
BUN SERPL-MCNC: 17 MG/DL (ref 5–25)
CALCIUM SERPL-MCNC: 9.8 MG/DL (ref 8.3–10.1)
CHLORIDE SERPL-SCNC: 102 MMOL/L (ref 100–108)
CO2 SERPL-SCNC: 25 MMOL/L (ref 21–32)
CREAT SERPL-MCNC: 0.93 MG/DL (ref 0.6–1.3)
GFR SERPL CREATININE-BSD FRML MDRD: 54 ML/MIN/1.73SQ M
GLUCOSE P FAST SERPL-MCNC: 227 MG/DL (ref 65–99)
POTASSIUM SERPL-SCNC: 4.1 MMOL/L (ref 3.5–5.3)
PROT SERPL-MCNC: 8.1 G/DL (ref 6.4–8.2)
SODIUM SERPL-SCNC: 136 MMOL/L (ref 136–145)

## 2019-10-12 PROCEDURE — 36415 COLL VENOUS BLD VENIPUNCTURE: CPT

## 2019-10-12 PROCEDURE — 80053 COMPREHEN METABOLIC PANEL: CPT

## 2019-10-24 ENCOUNTER — TELEPHONE (OUTPATIENT)
Dept: FAMILY MEDICINE CLINIC | Facility: CLINIC | Age: 84
End: 2019-10-24

## 2019-10-24 NOTE — TELEPHONE ENCOUNTER
Pt daughter called stated pt has sugar of 185 and needs medication for glucose to be send to local pharmacy, pt was scheduled an appt to be seen by the doctor  I spoke to Pt she stated her BP has been recorded as Monday 170, Tuesday 174 and yesterday 183

## 2019-10-28 ENCOUNTER — OFFICE VISIT (OUTPATIENT)
Dept: FAMILY MEDICINE CLINIC | Facility: CLINIC | Age: 84
End: 2019-10-28
Payer: MEDICARE

## 2019-10-28 VITALS
TEMPERATURE: 98 F | HEIGHT: 63 IN | SYSTOLIC BLOOD PRESSURE: 138 MMHG | HEART RATE: 96 BPM | OXYGEN SATURATION: 94 % | BODY MASS INDEX: 28.7 KG/M2 | DIASTOLIC BLOOD PRESSURE: 70 MMHG | WEIGHT: 162 LBS | RESPIRATION RATE: 16 BRPM

## 2019-10-28 DIAGNOSIS — K29.30 SUPERFICIAL GASTRITIS WITHOUT HEMORRHAGE, UNSPECIFIED CHRONICITY: ICD-10-CM

## 2019-10-28 DIAGNOSIS — M10.9 ACUTE GOUT INVOLVING TOE OF LEFT FOOT, UNSPECIFIED CAUSE: ICD-10-CM

## 2019-10-28 DIAGNOSIS — E11.9 TYPE 2 DIABETES MELLITUS WITHOUT COMPLICATION, WITHOUT LONG-TERM CURRENT USE OF INSULIN (HCC): Primary | ICD-10-CM

## 2019-10-28 DIAGNOSIS — E04.1 THYROID NODULE: ICD-10-CM

## 2019-10-28 PROCEDURE — 99214 OFFICE O/P EST MOD 30 MIN: CPT | Performed by: FAMILY MEDICINE

## 2019-10-28 RX ORDER — OMEPRAZOLE 20 MG/1
20 CAPSULE, DELAYED RELEASE ORAL DAILY
Qty: 30 CAPSULE | Refills: 1 | Status: SHIPPED | OUTPATIENT
Start: 2019-10-28 | End: 2020-01-29

## 2019-10-28 RX ORDER — LANCETS 28 GAUGE
EACH MISCELLANEOUS
Refills: 5 | COMMUNITY
Start: 2019-09-03 | End: 2020-01-15

## 2019-10-28 RX ORDER — ALLOPURINOL 100 MG/1
100 TABLET ORAL DAILY
Qty: 90 TABLET | Refills: 3 | Status: SHIPPED | OUTPATIENT
Start: 2019-10-28 | End: 2020-03-17 | Stop reason: SDUPTHER

## 2019-10-28 NOTE — PROGRESS NOTES
Assessment/Plan:    No problem-specific Assessment & Plan notes found for this encounter  There are no diagnoses linked to this encounter  Subjective:      Patient ID: Leny Brock is a 80 y o  female  HPI    The following portions of the patient's history were reviewed and updated as appropriate: allergies, current medications, past family history, past medical history, past social history, past surgical history and problem list     Review of Systems   Constitutional: Negative for diaphoresis, fatigue, fever and unexpected weight change  Respiratory: Negative for cough, chest tightness, shortness of breath and wheezing  Cardiovascular: Negative for chest pain, palpitations and leg swelling  Gastrointestinal: Negative for abdominal pain, blood in stool, nausea and vomiting  Neurological: Negative for dizziness, syncope, light-headedness and headaches  Hematological: Does not bruise/bleed easily  Psychiatric/Behavioral: Negative for behavioral problems, self-injury and sleep disturbance  The patient is not nervous/anxious  Objective:      /70 (BP Location: Left arm, Patient Position: Sitting, Cuff Size: Standard)   Pulse 96   Temp 98 °F (36 7 °C) (Oral)   Resp 16   Ht 5' 3" (1 6 m)   Wt 73 5 kg (162 lb)   SpO2 94%   Breastfeeding? No   BMI 28 70 kg/m²          Physical Exam   HENT:   Nose: Nose normal    Mouth/Throat: Oropharynx is clear and moist  No oropharyngeal exudate  Eyes: Pupils are equal, round, and reactive to light  EOM are normal  Right eye exhibits no discharge  Left eye exhibits no discharge  No scleral icterus  Cardiovascular: Normal rate, regular rhythm, normal heart sounds and intact distal pulses  Exam reveals no friction rub  No murmur heard  Pulmonary/Chest: Effort normal  No respiratory distress  She has no wheezes  She has no rales  She exhibits no tenderness  Musculoskeletal: Normal range of motion  Neurological: She is alert  Skin: Skin is warm and dry  No rash noted  No erythema  Psychiatric: She has a normal mood and affect  Her behavior is normal    Nursing note and vitals reviewed

## 2019-10-28 NOTE — PROGRESS NOTES
Assessment/Plan:    Acute gout involving toe of left foot  The risk of gout attack after allopurinol is high, explained the patient the need to decrease uric acid which was 14 previous test   She is in high risk for gout attack  I gave the option to wait for another gout attack in order to decide treatment  She decide to take allopurinol  Diabetes mellitus ([facility] )    Lab Results   Component Value Date    HGBA1C 6 4 (H) 09/30/2019    Mild elevation of blood sugar, patient prefers to take medication that she used in the past which is Januvia  Starting Januvia at 50 mg daily  Thyroid nodule  To rule out neoplasia  Diagnoses and all orders for this visit:    Type 2 diabetes mellitus without complication, without long-term current use of insulin (HCC)  -     sitaGLIPtin (JANUVIA) 100 mg tablet; Take 1 tablet (100 mg total) by mouth daily    Thyroid nodule  -     US thyroid; Future    Acute gout involving toe of left foot, unspecified cause  -     allopurinol (ZYLOPRIM) 100 mg tablet; Take 1 tablet (100 mg total) by mouth daily    Superficial gastritis without hemorrhage, unspecified chronicity  -     omeprazole (PriLOSEC) 20 mg delayed release capsule; Take 1 capsule (20 mg total) by mouth daily    Other orders  -     Lancets (FREESTYLE) lancets; CHECK BLOOD SUGAR BID          Subjective:      Patient ID: Bishop Lr is a 80 y o  female  Patient is here to follow up on knee, shoulder pain and diabetes  Patient states that pain in her shoulder and knee are the same  Patient states that she is in compliance with her medications  Patients states that her BS have been high  Her last hemoglobin A1c was 6 2 on September 2019  She is currently being treated for active pulmonary tuberculosis  Her appetite improved which she believes is causing blood sugar to go up  Recently also she was found having hyper uricemia due to Ethambutol and pirazinamide treatment for tuberculosis         The following portions of the patient's history were reviewed and updated as appropriate: allergies, current medications, past family history, past medical history, past social history, past surgical history and problem list     Review of Systems   Constitutional: Negative for diaphoresis, fatigue, fever and unexpected weight change  Respiratory: Negative for cough, chest tightness, shortness of breath and wheezing  Cardiovascular: Negative for chest pain, palpitations and leg swelling  Gastrointestinal: Negative for abdominal pain, blood in stool, nausea and vomiting  Endocrine:        Enlarged thyroid and she was found calcific gated no to and requires further studies as per previous CT scan of the chest    Musculoskeletal: Positive for arthralgias (Chronic osteoarthritis of knees)  Neurological: Negative for dizziness, syncope, light-headedness and headaches  Hematological: Does not bruise/bleed easily  Psychiatric/Behavioral: Negative for behavioral problems, self-injury and sleep disturbance  The patient is not nervous/anxious  Objective:      /70 (BP Location: Left arm, Patient Position: Sitting, Cuff Size: Standard)   Pulse 96   Temp 98 °F (36 7 °C) (Oral)   Resp 16   Ht 5' 3" (1 6 m)   Wt 73 5 kg (162 lb)   SpO2 94%   Breastfeeding? No   BMI 28 70 kg/m²          Physical Exam   HENT:   Nose: Nose normal    Mouth/Throat: Oropharynx is clear and moist  No oropharyngeal exudate  Eyes: Pupils are equal, round, and reactive to light  EOM are normal  Right eye exhibits no discharge  Left eye exhibits no discharge  No scleral icterus  Cardiovascular: Normal rate, regular rhythm, normal heart sounds and intact distal pulses  Exam reveals no friction rub  No murmur heard  Pulmonary/Chest: Effort normal  No respiratory distress  She has no wheezes  She has no rales  She exhibits no tenderness     Musculoskeletal:        Right knee: She exhibits deformity (Bilaterally) and abnormal patellar mobility (And gait difficulty  She uses a walker  )  She exhibits normal range of motion  Neurological: She is alert  Skin: Skin is warm and dry  No rash noted  No erythema  Psychiatric: She has a normal mood and affect  Her behavior is normal    Nursing note and vitals reviewed

## 2019-10-28 NOTE — ASSESSMENT & PLAN NOTE
The risk of gout attack after allopurinol is high, explained the patient the need to decrease uric acid which was 14 previous test   She is in high risk for gout attack  I gave the option to wait for another gout attack in order to decide treatment  She decide to take allopurinol

## 2019-10-28 NOTE — ASSESSMENT & PLAN NOTE
Lab Results   Component Value Date    HGBA1C 6 4 (H) 09/30/2019    Mild elevation of blood sugar, patient prefers to take medication that she used in the past which is Januvia  Starting Januvia at 50 mg daily

## 2019-10-28 NOTE — PATIENT INSTRUCTIONS
Diabetes en el adulto mayor   LO QUE NECESITA SABER:   Los adultos mayores con diabetes están en riesgo de tener enfermedad cardíaca, derrame cerebral, enfermedad renal, ceguera y daño a los nervios  También podría estar en riesgo de alguna de las siguientes condiciones:  · Nutrición deficiente o bajos niveles de azúcar en la rick    · Confusión o problemas con la memoria, la atención o para aprender cosas nuevas    · Problemas para controlar la orina o infecciones urinarias frecuentes    · Problemas con la coordinación o el equilibrio    · Caídas y lesiones    · Dolor    · Depresión    · Llagas abiertas en las piernas o los pies  [facility]CCIONES SOBRE [facility]:   Llame al 911 en reuben de presentar lo siguiente:   · Usted tiene alguno de los siguientes signos de derrame cerebral:      ¨ Adormecimiento o caída de un lado de groves ángel     ¨ Debilidad en un brazo o mulu pierna    ¨ Confusión o debilidad para hablar    ¨ Mareos o dolor de kulwant intenso, o pérdida de la visión  · Usted tiene alguno de los siguientes signos de un ataque cardíaco:      ¨ Estornudos, presión, o dolor en groves pecho que dura mas de 5 minutos o regresa  ¨ Malestar o dolor en groves espalda, carson, mandíbula, abdomen, o brazo     ¨ Dificultad para respirar    ¨ Náuseas o vómito    ¨ Siente un desvanecimiento o tiene sudores fríos especialmente en el pecho o dificultad para respirar  Regrese a la jose de emergencias si:   · Usted tiene dolor abdominal intenso, o el dolor se extiende a groves espalda  Es posible que también esté vomitando  · Usted tiene dificultad para permanecer despierto o concentrarse  · Usted está temblando o sudando  · Usted tiene visión borrosa o doble  · Groves aliento huele a fruta o pato  · Groves respiración es profunda y [place], o rápida y superficial      · Groves ritmo cardíaco es acelerado y débil  · Sufre mulu caída y se lastima    Pregúntele a groves Dorisann Rodriguez vitaminas y minerales son adecuados para usted  · Tiene vómitos o diarrea  · Tiene malestar estomacal y no puede ingerir los alimentos de groves plan de comidas  · Usted se siente débil o más cansado de lo habitual      · Usted tiene [place], jeffrey de [place] o se irrita con facilidad  · Groves piel está robby, tibia, seca o inflamada  · Usted tiene mulu herida que no cicatriza  · Usted tiene entumecimiento en los brazos o piernas  · Usted tiene problemas para sobrellevar groves enfermedad, o se siente ansioso o deprimido  · Usted tiene problemas con la memoria  · Usted presenta cambios en groves visión  · Usted tiene preguntas o inquietudes acerca de groves condición o cuidado  Medicamentos,  se puede administrar para disminuir la cantidad de azúcar en groves rick  También puede necesitar medicamentos para bajar groves presión arterial o colesterol, o para prevenir coágulos de rick  Recuerde controlar los factores de la sigla [facility] y prevenga los problemas causados por la diabetes:   · Revise groves nivel de azúcar en la rick lynette se le haya indicado  Groves médico le dirá cuándo y con qué frecuencia lo debe revisar  Groves médico también le indicará cuáles deben ser saurav niveles de azúcar en la rick antes y después de [facility] comida  Usted puede necesitar revisar groves orina o rick por la presencia de cetonas, en reuben que groves nivel esté más alto de lo recomendado  Anote saurav resultados y muéstreselos a groves médico  Puede que éste utilice los resultados para realizar modificaciones en groves medicación y groves alimentación o en los horarios en que usted hace ejercicio  [place]dale a groves médico más información acerca de cómo tratar un nivel de azúcar en la rick alto o bajo  · Siga groves plan de comidas según indicaciones  Un dietista lo ayudará a hacer un plan de comidas para mantener groves nivel de azúcar en la rick estable y asegurarse mulu nutrición adecuada  No se salte ninguna comida   Groves nivel de azúcar en la rick puede bajar demasiado si usted ha tomado medicamento para la diabetes y no ha comido  Consulte con groves médico acerca de los programas en groves comunidad que pueden ofrecerle la entrega de comidas a domicilio  · Intente estar activo de 30 a 60 minutos sebastien todos los días de la [place]  La actividad física puede ayudarlo a mantener el nivel de glucosa en la rick estable, disminuir groves riesgo de insuficiencia cardíaca y [place] a bajar de [place]  También puede ayudarlo a mejorar groves equilibrio y [place] riesgo de caídas  Colabore con groves médico para elaborar un plan de ejercicios  Pídale a un familiar o amigo que jamil ejercicio con usted  Comience lentamente y ejercite de 5 a 10 minutos a la vez  Ejemplos de actividades incluyen caminar o nadar  Incluya ejercicios para fortalecer los músculos de 2 a 3 días a la semana  Incluya entrenamiento del equilibrio de 2 a 3 veces a la semana  Actividades que ayudan a aumentar el equilibrio incluyen yoga y karen chi      · Mantenga un peso saludable  Consulte con groves médico cuánto debería pesar  El peso saludable puede ayudarle a controlar groves diabetes y a evitar mulu enfermedad cardíaca  Solicite a groves médico que le ayude a crear un plan para perder peso de mulu forma patel si usted tiene sobrepeso  Juntos podrán fijar metas de pérdida de peso alcanzables  · No fume  Pida información a groves médico si usted actualmente fuma y necesita ayuda para dejar de fumar  No use cigarrillos electrónicos o tabaco sin humo en vez de cigarrillos o para tratar de dejar de fumar  Todos estos aún contienen nicotina  · Controle el estrés  El estrés puede aumentar groves nivel de azúcar en la rick  La respiración profunda, la relajación muscular y la música pueden ayudarlo a relajarse  Solicite a groves médico más información sobre estas prácticas  Otras maneras de controlar groves diabetes:   · Revise saurav pies todos los días para krishan si tienen llagas  Observe todo el pie, incluyendo la planta del pie, entre y [employer] dedos  Revise si hay heridas y callos  Use un yamini para verse la planta de los pies  La piel de los pies podría estar brillante, tirante, seca o más oscura de lo normal  Edda pies también podrían estar fríos y pálidos  Pase edda mell por encima, por debajo, por los lados y [place] dedos del pie para sentir la piel  El enrojecimiento, inflamación y calor son signos de problemas con el flujo sanguíneo que pueden conllevar a mulu úlcera en el pie  No trate de quitarse los callos usted mismo  · Use identificación de alerta médica  Use un brazalete o collar de alerta médica o lleve consigo mulu tarjeta que indique que tiene diabetes  Pregúntele a groves médico dónde conseguir estos artículos  · [place] vacunas  Usted corre un mayor riesgo de presentar enfermedades graves si usted contrae la gripe, neumonía o hepatitis  Pregúntele a groves médico si usted debe ponerse la vacuna contra la gripe, la neumonía o la hepatitis B, y cuándo debe hacerlo  · Asista a todas edda citas  Necesitará regresar para que le realicen el examen de hemoglobina A1c cada 3 meses  Tendrá que regresar por lo menos mulu vez al año para que le revisen los pies  Necesitará de un examen de la vista mulu vez al año para revisar si tiene retinopatía  También necesitará exámenes de orina anuales para revisar si hay problemas renales  Es posible que deba realizarse exámenes para detectar enfermedad cardíaca  Anote edda preguntas para que se acuerde de hacerlas ariadne edda visitas  · Pídale ayuda a edda familiares y amigos  Puede que necesite ayuda para comprobar groves nivel de azúcar en la rick, inyectarse la insulina o preparar las comidas  Pídale a edda familiareas y amigos que lo ayuden con estas tareas  Hable con groves médico si no tiene a nadie que le ayude en groves hogar  Un médico puede venir a groves casa para ayudarlo con estas tareas  Acuda a edda consultas de control con groves médico según le indicaron    Necesitará regresar para que le realicen el examen de hemoglobina A1c cada 3 meses  Tendrá que regresar por lo menos mulu vez al año para que le revisen los pies  Necesitará de un examen de la vista mulu vez al año para revisar si tiene retinopatía  También necesitará exámenes de orina anuales para revisar si hay problemas renales  Es posible que deba realizarse exámenes para detectar enfermedad cardíaca  Anote saurav preguntas para que se acuerde de hacerlas ariadne saurav visitas  © 2017 [address] Information is for End User's use only and may not be sold, redistributed or otherwise used for commercial purposes  All illustrations and images included in CareNotes® are the copyrighted property of A D A M , Inc  or Man Kidd  Esta información es sólo para uso en educación  Fletcher intención no es darle un consejo médico sobre enfermedades o tratamientos  Colsulte con fletcher [place] farmacéutico antes de seguir cualquier régimen médico para saber si es seguro y efectivo para usted

## 2019-11-12 DIAGNOSIS — I10 BENIGN ESSENTIAL HYPERTENSION: Primary | ICD-10-CM

## 2019-11-13 DIAGNOSIS — I10 BENIGN ESSENTIAL HYPERTENSION: ICD-10-CM

## 2019-11-13 RX ORDER — LOSARTAN POTASSIUM 25 MG/1
25 TABLET ORAL DAILY
Qty: 30 TABLET | Refills: 5 | Status: SHIPPED | OUTPATIENT
Start: 2019-11-13 | End: 2019-11-13 | Stop reason: SDUPTHER

## 2019-11-14 RX ORDER — LOSARTAN POTASSIUM 25 MG/1
TABLET ORAL
Qty: 90 TABLET | Refills: 0 | Status: SHIPPED | OUTPATIENT
Start: 2019-11-14 | End: 2020-02-12

## 2019-11-15 ENCOUNTER — APPOINTMENT (OUTPATIENT)
Dept: LAB | Age: 84
End: 2019-11-15
Payer: MEDICARE

## 2019-11-15 ENCOUNTER — TRANSCRIBE ORDERS (OUTPATIENT)
Dept: ADMINISTRATIVE | Facility: HOSPITAL | Age: 84
End: 2019-11-15

## 2019-11-15 DIAGNOSIS — Z79.899 ENCOUNTER FOR LONG-TERM (CURRENT) USE OF OTHER MEDICATIONS: Primary | ICD-10-CM

## 2019-11-15 DIAGNOSIS — Z79.899 ENCOUNTER FOR LONG-TERM (CURRENT) USE OF OTHER MEDICATIONS: ICD-10-CM

## 2019-11-15 LAB
ALT SERPL W P-5'-P-CCNC: 17 U/L (ref 12–78)
AST SERPL W P-5'-P-CCNC: 19 U/L (ref 5–45)
BILIRUB SERPL-MCNC: 0.48 MG/DL (ref 0.2–1)

## 2019-11-15 PROCEDURE — 82247 BILIRUBIN TOTAL: CPT

## 2019-11-15 PROCEDURE — 84460 ALANINE AMINO (ALT) (SGPT): CPT

## 2019-11-15 PROCEDURE — 84450 TRANSFERASE (AST) (SGOT): CPT

## 2019-11-15 PROCEDURE — 36415 COLL VENOUS BLD VENIPUNCTURE: CPT

## 2019-12-10 ENCOUNTER — APPOINTMENT (OUTPATIENT)
Dept: LAB | Age: 84
End: 2019-12-10
Payer: MEDICARE

## 2019-12-10 DIAGNOSIS — Z79.899 ENCOUNTER FOR LONG-TERM (CURRENT) USE OF OTHER MEDICATIONS: ICD-10-CM

## 2019-12-10 LAB
ALT SERPL W P-5'-P-CCNC: 19 U/L (ref 12–78)
AST SERPL W P-5'-P-CCNC: 16 U/L (ref 5–45)
BILIRUB SERPL-MCNC: 0.42 MG/DL (ref 0.2–1)

## 2019-12-10 PROCEDURE — 84460 ALANINE AMINO (ALT) (SGPT): CPT

## 2019-12-10 PROCEDURE — 84450 TRANSFERASE (AST) (SGOT): CPT

## 2019-12-10 PROCEDURE — 82247 BILIRUBIN TOTAL: CPT

## 2019-12-10 PROCEDURE — 36415 COLL VENOUS BLD VENIPUNCTURE: CPT

## 2020-01-13 ENCOUNTER — TRANSCRIBE ORDERS (OUTPATIENT)
Dept: ADMINISTRATIVE | Facility: HOSPITAL | Age: 85
End: 2020-01-13

## 2020-01-13 ENCOUNTER — LAB (OUTPATIENT)
Dept: LAB | Age: 85
End: 2020-01-13
Payer: MEDICARE

## 2020-01-13 DIAGNOSIS — Z79.899 ENCOUNTER FOR LONG-TERM (CURRENT) USE OF OTHER MEDICATIONS: Primary | ICD-10-CM

## 2020-01-13 DIAGNOSIS — Z79.899 ENCOUNTER FOR LONG-TERM (CURRENT) USE OF OTHER MEDICATIONS: ICD-10-CM

## 2020-01-13 DIAGNOSIS — E03.9 HYPOTHYROIDISM (ACQUIRED): ICD-10-CM

## 2020-01-13 LAB
ALT SERPL W P-5'-P-CCNC: 18 U/L (ref 12–78)
AST SERPL W P-5'-P-CCNC: 22 U/L (ref 5–45)
BILIRUB SERPL-MCNC: 0.35 MG/DL (ref 0.2–1)

## 2020-01-13 PROCEDURE — 84460 ALANINE AMINO (ALT) (SGPT): CPT

## 2020-01-13 PROCEDURE — 36415 COLL VENOUS BLD VENIPUNCTURE: CPT

## 2020-01-13 PROCEDURE — 84443 ASSAY THYROID STIM HORMONE: CPT

## 2020-01-13 PROCEDURE — 82247 BILIRUBIN TOTAL: CPT

## 2020-01-13 PROCEDURE — 84450 TRANSFERASE (AST) (SGOT): CPT

## 2020-01-15 ENCOUNTER — OFFICE VISIT (OUTPATIENT)
Dept: FAMILY MEDICINE CLINIC | Facility: CLINIC | Age: 85
End: 2020-01-15
Payer: MEDICARE

## 2020-01-15 VITALS
WEIGHT: 171 LBS | SYSTOLIC BLOOD PRESSURE: 120 MMHG | RESPIRATION RATE: 16 BRPM | DIASTOLIC BLOOD PRESSURE: 60 MMHG | HEART RATE: 99 BPM | HEIGHT: 63 IN | OXYGEN SATURATION: 96 % | TEMPERATURE: 98.1 F | BODY MASS INDEX: 30.3 KG/M2

## 2020-01-15 DIAGNOSIS — D64.9 ANEMIA, UNSPECIFIED TYPE: ICD-10-CM

## 2020-01-15 DIAGNOSIS — R26.9 NEUROLOGIC GAIT DYSFUNCTION: ICD-10-CM

## 2020-01-15 DIAGNOSIS — E11.9 TYPE 2 DIABETES MELLITUS WITHOUT COMPLICATION, WITHOUT LONG-TERM CURRENT USE OF INSULIN (HCC): Primary | ICD-10-CM

## 2020-01-15 DIAGNOSIS — E03.9 HYPOTHYROIDISM (ACQUIRED): ICD-10-CM

## 2020-01-15 DIAGNOSIS — M81.6 LOCALIZED OSTEOPOROSIS, UNSPECIFIED PATHOLOGICAL FRACTURE PRESENCE: ICD-10-CM

## 2020-01-15 DIAGNOSIS — S10.91XA ABRASION OF NECK, INITIAL ENCOUNTER: ICD-10-CM

## 2020-01-15 DIAGNOSIS — M54.2 CERVICALGIA: ICD-10-CM

## 2020-01-15 PROCEDURE — 99214 OFFICE O/P EST MOD 30 MIN: CPT | Performed by: FAMILY MEDICINE

## 2020-01-15 RX ORDER — BLOOD-GLUCOSE METER
1 KIT MISCELLANEOUS 3 TIMES DAILY
Qty: 1 EACH | Refills: 0 | Status: SHIPPED | OUTPATIENT
Start: 2020-01-15 | End: 2020-09-09 | Stop reason: ALTCHOICE

## 2020-01-15 RX ORDER — LANCETS 28 GAUGE
EACH MISCELLANEOUS
Qty: 100 EACH | Refills: 5 | Status: SHIPPED | OUTPATIENT
Start: 2020-01-15 | End: 2020-01-16 | Stop reason: SDUPTHER

## 2020-01-15 NOTE — PROGRESS NOTES
Assessment/Plan:  1  Anemia, unspecified type  I reviewed previous laboratory data and consulting notes, we are going to repeat labs to ensure stability    - CBC and differential; Future    2  Hypothyroidism (acquired)  I reviewed previous laboratory data and consulting notes, we are going to repeat labs to ensure stability    - TSH, 3rd generation; Future    3  Type 2 diabetes mellitus without complication, without long-term current use of insulin (HCC)  she is stable in current treatment  Medications were reviewed with  Lifestyle modification was encouraged  Side effect of medications were discussed  - HEMOGLOBIN A1C W/ EAG ESTIMATION; Future  - Microalbumin / creatinine urine ratio  - glucose monitoring kit (FREESTYLE) monitoring kit; 1 each by Does not apply route 3 (three) times a day  Dispense: 1 each; Refill: 0  - Lancets (FREESTYLE) lancets; Use as instructed  Dispense: 100 each; Refill: 5  - glucose blood (FREESTYLE LITE) test strip; Use as instructed  Dispense: 100 each; Refill: 5    4  Localized osteoporosis, unspecified pathological fracture presence  Check bone disnity   - DXA bone density spine hip and pelvis; Future        7  Cervicalgia  The choice of NSAID's in this patient depends of her current pain syndrome  I explained to patient that response of NSAIDs varies between patient's and also is differs in regarding to the area of the body that is affected in the progression of the damage  The drug interactions and comorbidities affected  by these medications were explained to the patient also; the caution in toxicity in the GI tract was also discussed  Prevent the long-term use of these medications may be wise  The use ice and physical therapy is necessary in order to get the best results  NSAID's might elevate BP, or block effect of certain antihypertensive agents  It is to used with caution  Always use ice and therapy to decrease or avoid the need for a Pharmacological agent      - diclofenac sodium (VOLTAREN) 1 %; Apply 2 g topically 4 (four) times a day  Dispense: 100 g; Refill: 0    8   gait dysfunction    - Shower chair needed to prevent falls  No problem-specific Assessment & Plan notes found for this encounter  Diagnoses and all orders for this visit:    Type 2 diabetes mellitus without complication, without long-term current use of insulin (HCC)  -     HEMOGLOBIN A1C W/ EAG ESTIMATION; Future  -     Microalbumin / creatinine urine ratio    Anemia, unspecified type  -     CBC and differential; Future    Hypothyroidism (acquired)  -     TSH, 3rd generation; Future    Other orders  -     DXA bone density spine hip and pelvis; Future          Subjective:      Patient ID: Elayne Stantno is a 80 y o  female  TB Follow up, Diabetes, HTN, Hypothyroidism, Neck pain    Diabetes   Pertinent negatives for hypoglycemia include no confusion, dizziness or headaches  Pertinent negatives for diabetes include no chest pain  Hypertension   Associated symptoms include neck pain (for 2 weeks over posterior area, no h/o of trauma  )  Pertinent negatives include no chest pain, headaches, palpitations or shortness of breath  Neck Pain    Pertinent negatives include no chest pain or headaches  The following portions of the patient's history were reviewed and updated as appropriate: allergies, current medications, past family history, past medical history, past social history, past surgical history and problem list     Review of Systems   Constitutional:        Improving fatigue and gaining weight  HENT: Negative  Eyes: Negative  Respiratory: Negative for cough, choking, chest tightness and shortness of breath  Pulmonary TB managed by the Dept of health  Cardiovascular: Negative for chest pain, palpitations and leg swelling  Gastrointestinal: Negative for abdominal distention, abdominal pain and blood in stool     Endocrine:        Diabetes with home numbers controlled, Some hyperglycemia and some hypo, She needs a new glucometer  Genitourinary: Negative for difficulty urinating, dyspareunia, dysuria, vaginal discharge and vaginal pain  Musculoskeletal: Positive for neck pain (for 2 weeks over posterior area, no h/o of trauma  )  Negative for arthralgias, back pain, gait problem and joint swelling  Neurological: Negative for dizziness, facial asymmetry, light-headedness and headaches  Psychiatric/Behavioral: Negative for agitation, behavioral problems, confusion and decreased concentration  Objective:      /60 (BP Location: Left arm, Patient Position: Sitting, Cuff Size: Standard)   Pulse 99   Temp 98 1 °F (36 7 °C) (Oral)   Resp 16   Ht 5' 3" (1 6 m)   Wt 77 6 kg (171 lb)   SpO2 96%   Breastfeeding? No   BMI 30 29 kg/m²          Physical Exam   HENT:   Head: Normocephalic  Right Ear: External ear normal    Left Ear: External ear normal    Nose: Nose normal    Mouth/Throat: Oropharynx is clear and moist    Eyes: Pupils are equal, round, and reactive to light  Neck: No JVD present  No tracheal deviation present  No thyromegaly present  Cardiovascular: Normal rate and regular rhythm  Exam reveals no gallop and no friction rub  No murmur heard  Pulmonary/Chest: No stridor  No respiratory distress  She has no wheezes  She has no rales  She exhibits no tenderness  Abdominal: Soft  Bowel sounds are normal  She exhibits no distension  There is no tenderness  Musculoskeletal: Normal range of motion  She exhibits tenderness and deformity (of knees due to OA )  She exhibits no edema  Lymphadenopathy:     She has no cervical adenopathy  Skin: Skin is warm and dry  Psychiatric: She has a normal mood and affect   Her behavior is normal  Thought content normal

## 2020-01-16 DIAGNOSIS — E11.9 TYPE 2 DIABETES MELLITUS WITHOUT COMPLICATION, WITHOUT LONG-TERM CURRENT USE OF INSULIN (HCC): ICD-10-CM

## 2020-01-16 LAB — TSH SERPL DL<=0.05 MIU/L-ACNC: 1.03 UIU/ML (ref 0.36–3.74)

## 2020-01-17 ENCOUNTER — LAB (OUTPATIENT)
Dept: LAB | Facility: HOSPITAL | Age: 85
End: 2020-01-17
Payer: MEDICARE

## 2020-01-17 DIAGNOSIS — E11.9 TYPE 2 DIABETES MELLITUS WITHOUT COMPLICATION, WITHOUT LONG-TERM CURRENT USE OF INSULIN (HCC): ICD-10-CM

## 2020-01-17 DIAGNOSIS — D64.9 ANEMIA, UNSPECIFIED TYPE: ICD-10-CM

## 2020-01-17 LAB
ANISOCYTOSIS BLD QL SMEAR: PRESENT
CREAT UR-MCNC: 73.7 MG/DL
EOSINOPHIL # BLD AUTO: 0.13 THOUSAND/UL (ref 0–0.4)
EOSINOPHIL NFR BLD MANUAL: 2 % (ref 0–6)
ERYTHROCYTE [DISTWIDTH] IN BLOOD BY AUTOMATED COUNT: 14 %
EST. AVERAGE GLUCOSE BLD GHB EST-MCNC: 140 MG/DL
GIANT PLATELETS BLD QL SMEAR: PRESENT
HBA1C MFR BLD: 6.5 % (ref 4.2–6.3)
HCT VFR BLD AUTO: 31.8 % (ref 36–46)
HGB BLD-MCNC: 10.6 G/DL (ref 12–16)
LYMPHOCYTES # BLD AUTO: 1.51 THOUSAND/UL (ref 0.5–4)
LYMPHOCYTES # BLD AUTO: 24 % (ref 25–45)
MCH RBC QN AUTO: 30.6 PG (ref 26.8–34.3)
MCHC RBC AUTO-ENTMCNC: 33.3 G/DL (ref 31.4–37.4)
MCV RBC AUTO: 92 FL (ref 80–100)
MICROALBUMIN UR-MCNC: 12 MG/L (ref 0–20)
MICROALBUMIN/CREAT 24H UR: 16 MG/G CREATININE (ref 0–30)
MONOCYTES # BLD AUTO: 1.51 THOUSAND/UL (ref 0.2–0.9)
MONOCYTES NFR BLD AUTO: 24 % (ref 1–10)
NEUTS SEG # BLD: 3.15 THOUSAND/UL (ref 1.8–7.8)
NEUTS SEG NFR BLD AUTO: 50 %
PLATELET # BLD AUTO: 267 THOUSANDS/UL (ref 150–450)
PLATELET BLD QL SMEAR: ADEQUATE
PMV BLD AUTO: 9.2 FL (ref 8.9–12.7)
POIKILOCYTOSIS BLD QL SMEAR: PRESENT
RBC # BLD AUTO: 3.46 MILLION/UL (ref 4–5.2)
RBC MORPH BLD: PRESENT
TOTAL CELLS COUNTED SPEC: 100
WBC # BLD AUTO: 6.3 THOUSAND/UL (ref 4.31–10.16)

## 2020-01-17 PROCEDURE — 83036 HEMOGLOBIN GLYCOSYLATED A1C: CPT

## 2020-01-17 PROCEDURE — 85027 COMPLETE CBC AUTOMATED: CPT

## 2020-01-17 PROCEDURE — 36415 COLL VENOUS BLD VENIPUNCTURE: CPT

## 2020-01-17 PROCEDURE — 82570 ASSAY OF URINE CREATININE: CPT | Performed by: FAMILY MEDICINE

## 2020-01-17 PROCEDURE — 82043 UR ALBUMIN QUANTITATIVE: CPT | Performed by: FAMILY MEDICINE

## 2020-01-17 PROCEDURE — 85007 BL SMEAR W/DIFF WBC COUNT: CPT

## 2020-01-17 RX ORDER — LANCETS 28 GAUGE
EACH MISCELLANEOUS
Qty: 100 EACH | Refills: 5 | Status: SHIPPED | OUTPATIENT
Start: 2020-01-17 | End: 2020-09-09 | Stop reason: ALTCHOICE

## 2020-01-22 DIAGNOSIS — D64.9 ANEMIA, UNSPECIFIED TYPE: Primary | ICD-10-CM

## 2020-01-24 ENCOUNTER — LAB (OUTPATIENT)
Dept: LAB | Facility: HOSPITAL | Age: 85
End: 2020-01-24
Payer: MEDICARE

## 2020-01-24 DIAGNOSIS — D64.9 ANEMIA, UNSPECIFIED TYPE: ICD-10-CM

## 2020-01-24 LAB
FERRITIN SERPL-MCNC: 26 NG/ML (ref 8–388)
IRON SATN MFR SERPL: 28 %
IRON SERPL-MCNC: 79 UG/DL (ref 50–170)
TIBC SERPL-MCNC: 280 UG/DL (ref 250–450)

## 2020-01-24 PROCEDURE — 83540 ASSAY OF IRON: CPT

## 2020-01-24 PROCEDURE — 82728 ASSAY OF FERRITIN: CPT

## 2020-01-24 PROCEDURE — 36415 COLL VENOUS BLD VENIPUNCTURE: CPT

## 2020-01-24 PROCEDURE — 83550 IRON BINDING TEST: CPT

## 2020-01-28 DIAGNOSIS — K29.30 SUPERFICIAL GASTRITIS WITHOUT HEMORRHAGE, UNSPECIFIED CHRONICITY: ICD-10-CM

## 2020-01-29 DIAGNOSIS — K29.30 SUPERFICIAL GASTRITIS WITHOUT HEMORRHAGE, UNSPECIFIED CHRONICITY: ICD-10-CM

## 2020-01-29 RX ORDER — OMEPRAZOLE 20 MG/1
CAPSULE, DELAYED RELEASE ORAL
Qty: 90 CAPSULE | Refills: 0 | Status: SHIPPED | OUTPATIENT
Start: 2020-01-29 | End: 2020-03-17 | Stop reason: SDUPTHER

## 2020-01-29 RX ORDER — OMEPRAZOLE 20 MG/1
CAPSULE, DELAYED RELEASE ORAL
Qty: 30 CAPSULE | Refills: 0 | Status: SHIPPED | OUTPATIENT
Start: 2020-01-29 | End: 2020-01-29

## 2020-01-30 ENCOUNTER — TELEPHONE (OUTPATIENT)
Dept: FAMILY MEDICINE CLINIC | Facility: CLINIC | Age: 85
End: 2020-01-30

## 2020-01-30 NOTE — TELEPHONE ENCOUNTER
Patient called and stated that he has scratchy throat and that she has been around people that have been sick  Patient will like to know if she can be sent to the pharmacy

## 2020-02-04 ENCOUNTER — TRANSITIONAL CARE MANAGEMENT (OUTPATIENT)
Dept: FAMILY MEDICINE CLINIC | Facility: CLINIC | Age: 85
End: 2020-02-04

## 2020-02-10 DIAGNOSIS — I10 BENIGN ESSENTIAL HYPERTENSION: ICD-10-CM

## 2020-02-10 RX ORDER — OSELTAMIVIR PHOSPHATE 75 MG/1
CAPSULE ORAL
COMMUNITY
Start: 2020-02-04 | End: 2020-02-14 | Stop reason: ALTCHOICE

## 2020-02-12 RX ORDER — LOSARTAN POTASSIUM 25 MG/1
TABLET ORAL
Qty: 90 TABLET | Refills: 0 | Status: SHIPPED | OUTPATIENT
Start: 2020-02-12 | End: 2020-03-17 | Stop reason: SDUPTHER

## 2020-02-14 ENCOUNTER — TRANSCRIBE ORDERS (OUTPATIENT)
Dept: ADMINISTRATIVE | Facility: HOSPITAL | Age: 85
End: 2020-02-14

## 2020-02-14 ENCOUNTER — LAB (OUTPATIENT)
Dept: LAB | Facility: HOSPITAL | Age: 85
End: 2020-02-14
Payer: MEDICARE

## 2020-02-14 ENCOUNTER — OFFICE VISIT (OUTPATIENT)
Dept: FAMILY MEDICINE CLINIC | Facility: CLINIC | Age: 85
End: 2020-02-14
Payer: MEDICARE

## 2020-02-14 VITALS
DIASTOLIC BLOOD PRESSURE: 66 MMHG | TEMPERATURE: 98.2 F | SYSTOLIC BLOOD PRESSURE: 120 MMHG | OXYGEN SATURATION: 96 % | WEIGHT: 164 LBS | RESPIRATION RATE: 16 BRPM | BODY MASS INDEX: 29.06 KG/M2 | HEIGHT: 63 IN | HEART RATE: 80 BPM

## 2020-02-14 DIAGNOSIS — Z91.81 AT RISK FOR FALLS: ICD-10-CM

## 2020-02-14 DIAGNOSIS — IMO0001 UNCONTROLLED DIABETES MELLITUS TYPE 2 WITHOUT COMPLICATIONS: ICD-10-CM

## 2020-02-14 DIAGNOSIS — D64.9 ANEMIA, UNSPECIFIED TYPE: Primary | ICD-10-CM

## 2020-02-14 DIAGNOSIS — D64.9 ANEMIA, UNSPECIFIED TYPE: ICD-10-CM

## 2020-02-14 LAB
BURR CELLS BLD QL SMEAR: PRESENT
EOSINOPHIL # BLD AUTO: 0.25 THOUSAND/UL (ref 0–0.4)
EOSINOPHIL NFR BLD MANUAL: 4 % (ref 0–6)
ERYTHROCYTE [DISTWIDTH] IN BLOOD BY AUTOMATED COUNT: 13.8 %
HCT VFR BLD AUTO: 35.3 % (ref 36–46)
HGB BLD-MCNC: 11.8 G/DL (ref 12–16)
HYPERCHROMIA BLD QL SMEAR: PRESENT
LYMPHOCYTES # BLD AUTO: 1.39 THOUSAND/UL (ref 0.5–4)
LYMPHOCYTES # BLD AUTO: 22 % (ref 25–45)
MCH RBC QN AUTO: 30.8 PG (ref 26.8–34.3)
MCHC RBC AUTO-ENTMCNC: 33.3 G/DL (ref 31.4–37.4)
MCV RBC AUTO: 93 FL (ref 80–100)
MONOCYTES # BLD AUTO: 2.46 THOUSAND/UL (ref 0.2–0.9)
MONOCYTES NFR BLD AUTO: 39 % (ref 1–10)
NEUTS BAND NFR BLD MANUAL: 1 % (ref 0–8)
NEUTS SEG # BLD: 2.21 THOUSAND/UL (ref 1.8–7.8)
NEUTS SEG NFR BLD AUTO: 34 %
OVALOCYTES BLD QL SMEAR: PRESENT
PLATELET # BLD AUTO: 231 THOUSANDS/UL (ref 150–450)
PLATELET BLD QL SMEAR: ADEQUATE
PMV BLD AUTO: 9.5 FL (ref 8.9–12.7)
RBC # BLD AUTO: 3.82 MILLION/UL (ref 4–5.2)
RBC MORPH BLD: ABNORMAL
SCHISTOCYTES BLD QL SMEAR: PRESENT
TOTAL CELLS COUNTED SPEC: 100
WBC # BLD AUTO: 6.3 THOUSAND/UL (ref 4.31–10.16)

## 2020-02-14 PROCEDURE — 85007 BL SMEAR W/DIFF WBC COUNT: CPT

## 2020-02-14 PROCEDURE — 99495 TRANSJ CARE MGMT MOD F2F 14D: CPT | Performed by: FAMILY MEDICINE

## 2020-02-14 PROCEDURE — 85027 COMPLETE CBC AUTOMATED: CPT

## 2020-02-14 PROCEDURE — 36415 COLL VENOUS BLD VENIPUNCTURE: CPT

## 2020-02-14 RX ORDER — FERROUS SULFATE TAB EC 324 MG (65 MG FE EQUIVALENT) 324 (65 FE) MG
324 TABLET DELAYED RESPONSE ORAL
Qty: 60 TABLET | Refills: 2 | Status: SHIPPED | OUTPATIENT
Start: 2020-02-14 | End: 2020-03-17 | Stop reason: SDUPTHER

## 2020-02-14 NOTE — PROGRESS NOTES
Assessment/Plan:  1  Anemia, unspecified type  I reviewed previous laboratory data and consulting notes, we are going to repeat labs to ensure stability    - CBC and differential; Future  - ferrous sulfate 324 (65 Fe) mg; Take 1 tablet (324 mg total) by mouth 2 (two) times a day before meals  Dispense: 60 tablet; Refill: 2    2  Uncontrolled diabetes mellitus type 2 without complications ([address] )  No need for further treatment since average is appropriate for age  Medicine reconciliation and management was performed in this visit as part of  her discharge from Hospital I obtained and review discharge information  Reviewed the need for and/or follow up on pending test/treatments  Patient was educated about her conditions and treatments  We established the need to have a appropriate follow up    No problem-specific Assessment & Plan notes found for this encounter  There are no diagnoses linked to this encounter  BMI Counseling: Body mass index is 29 05 kg/m²  The BMI is above normal  Exercise recommendations include exercising 3-5 times per week  Subjective:      Patient ID: Quinn Hernandez is a 80 y o  female  HPI   Patient was admitted in [facility] facility on February 1, 2020 due to  Pneumonia, She spent 3 days and was discharged on February 4, 2020  Patient condition improved  (Shortness of breath [R06 02]  Pneumonia of left upper lobe due to infectious organism [facility]) [J18 1]  Procedure she had done:none  patient reports compliance with treatment  Patient feels better  TCM Call (since 1/14/2020)     Date and time call was made  2/4/2020  1:50 PM    Patient was hospitialized at  [facility]    Date of Admission  02/01/20    Date of discharge  02/04/20    Disposition  Home    Current Symptoms  Cough    Cough Severity  Moderate      TCM Call (since 1/14/2020)     Should patient be enrolled in anticoag monitoring? No    Scheduled for follow up?   Yes    Patients specialists  Other (comment)    Other specialists names  Jose G Roth MD    Did you obtain your prescribed medications  Yes    Do you need help managing your prescriptions or medications  No    Is transportation to your appointment needed  No    I have advised the patient to call PCP with any new or worsening symptoms  [address]    Are you recieving any outpatient services  No    Are you recieving home care services  No    Are you using any community resources  No    Current waiver services  No    Have you fallen in the last 12 months  No    Interperter language line needed  No        The following portions of the patient's history were reviewed and updated as appropriate: allergies, current medications, past family history, past medical history, past social history, past surgical history and problem list     Review of Systems   Constitutional: Negative for diaphoresis, fatigue, fever and unexpected weight change  Respiratory: Negative for cough, chest tightness, shortness of breath and wheezing  Cardiovascular: Negative for chest pain, palpitations and leg swelling  Gastrointestinal: Negative for abdominal pain, blood in stool and constipation  Neurological: Negative for dizziness, syncope, light-headedness and headaches  Hematological: Does not bruise/bleed easily  Psychiatric/Behavioral: Negative for behavioral problems, self-injury and sleep disturbance  The patient is not nervous/anxious  Objective:      /66 (BP Location: Left arm, Patient Position: Sitting, Cuff Size: Standard)   Pulse 80   Temp 98 2 °F (36 8 °C) (Oral)   Resp 16   Ht 5' 3" (1 6 m)   Wt 74 4 kg (164 lb)   SpO2 96%   Breastfeeding No   BMI 29 05 kg/m²          Physical Exam   HENT:   Nose: Nose normal    Mouth/Throat: Oropharynx is clear and moist  No oropharyngeal exudate  Eyes: Pupils are equal, round, and reactive to light   EOM are normal  Right eye exhibits no discharge  Left eye exhibits no discharge  No scleral icterus  Cardiovascular: Normal rate, regular rhythm, normal heart sounds and intact distal pulses  Exam reveals no friction rub  No murmur heard  Pulmonary/Chest: No respiratory distress  She has no wheezes  She has no rales  She exhibits no tenderness  Decreased breath sounds   Musculoskeletal: Normal range of motion  Neurological: She is alert  Skin: Skin is warm and dry  No rash noted  No erythema  Psychiatric: She has a normal mood and affect  Her behavior is normal    Nursing note and vitals reviewed  Falls Plan of Care: Balance, strength, and gait training instructions were provided

## 2020-03-03 DIAGNOSIS — E11.9 TYPE 2 DIABETES MELLITUS WITHOUT COMPLICATION, WITHOUT LONG-TERM CURRENT USE OF INSULIN (HCC): ICD-10-CM

## 2020-03-03 RX ORDER — SITAGLIPTIN 100 MG/1
TABLET, FILM COATED ORAL
Qty: 90 TABLET | Refills: 3 | Status: SHIPPED | OUTPATIENT
Start: 2020-03-03 | End: 2020-03-17 | Stop reason: SDUPTHER

## 2020-03-03 RX ORDER — SITAGLIPTIN 100 MG/1
TABLET, FILM COATED ORAL
Qty: 30 TABLET | Refills: 3 | Status: SHIPPED | OUTPATIENT
Start: 2020-03-03 | End: 2020-03-03

## 2020-03-17 DIAGNOSIS — D64.9 ANEMIA, UNSPECIFIED TYPE: ICD-10-CM

## 2020-03-17 DIAGNOSIS — M10.272 ACUTE DRUG-INDUCED GOUT INVOLVING TOE OF LEFT FOOT: ICD-10-CM

## 2020-03-17 DIAGNOSIS — M10.9 ACUTE GOUT INVOLVING TOE OF LEFT FOOT, UNSPECIFIED CAUSE: ICD-10-CM

## 2020-03-17 DIAGNOSIS — I10 BENIGN ESSENTIAL HYPERTENSION: ICD-10-CM

## 2020-03-17 DIAGNOSIS — M54.2 CERVICALGIA: ICD-10-CM

## 2020-03-17 DIAGNOSIS — E11.9 TYPE 2 DIABETES MELLITUS WITHOUT COMPLICATION, WITHOUT LONG-TERM CURRENT USE OF INSULIN (HCC): ICD-10-CM

## 2020-03-17 DIAGNOSIS — K29.30 SUPERFICIAL GASTRITIS WITHOUT HEMORRHAGE, UNSPECIFIED CHRONICITY: ICD-10-CM

## 2020-03-19 RX ORDER — ALLOPURINOL 100 MG/1
100 TABLET ORAL DAILY
Qty: 90 TABLET | Refills: 3 | Status: SHIPPED | OUTPATIENT
Start: 2020-03-19 | End: 2020-09-09 | Stop reason: ALTCHOICE

## 2020-03-19 RX ORDER — FERROUS SULFATE TAB EC 324 MG (65 MG FE EQUIVALENT) 324 (65 FE) MG
324 TABLET DELAYED RESPONSE ORAL
Qty: 60 TABLET | Refills: 2 | Status: SHIPPED | OUTPATIENT
Start: 2020-03-19 | End: 2020-06-04 | Stop reason: ALTCHOICE

## 2020-03-19 RX ORDER — LOSARTAN POTASSIUM 25 MG/1
25 TABLET ORAL DAILY
Qty: 90 TABLET | Refills: 3 | Status: SHIPPED | OUTPATIENT
Start: 2020-03-19 | End: 2020-09-09 | Stop reason: ALTCHOICE

## 2020-03-19 RX ORDER — OMEPRAZOLE 20 MG/1
20 CAPSULE, DELAYED RELEASE ORAL DAILY
Qty: 90 CAPSULE | Refills: 3 | Status: SHIPPED | OUTPATIENT
Start: 2020-03-19

## 2020-03-19 RX ORDER — COLCHICINE 0.6 MG/1
0.6 TABLET ORAL DAILY
Qty: 30 TABLET | Refills: 5 | Status: SHIPPED | OUTPATIENT
Start: 2020-03-19 | End: 2020-06-04 | Stop reason: ALTCHOICE

## 2020-05-13 NOTE — PATIENT INSTRUCTIONS
Fall Prevention   AMBULATORY CARE:   Fall prevention  includes ways to make your home and other areas safer  It also includes ways you can move more carefully to prevent a fall  Health conditions that cause changes in your blood pressure, vision, or muscle strength and coordination may increase your risk for falls  Medicines may also increase your risk for falls if they make you dizzy, weak, or sleepy  Call 911 or have someone else call if:   · You have fallen and are unconscious  · You have fallen and cannot move part of your body  Contact your healthcare provider if:   · You have fallen and have pain or a headache  · You have questions or concerns about your condition or care  Fall prevention tips:   · Stand or sit up slowly  This may help you keep your balance and prevent falls  · Use assistive devices as directed  Your healthcare provider may suggest that you use a cane or walker to help you keep your balance  You may need to have grab bars put in your bathroom near the toilet or in the shower  · Wear shoes that fit well and have soles that   Wear shoes both inside and outside  Use slippers with good   Do not wear shoes with high heels  · Wear a personal alarm  This is a device that allows you to call 911 if you fall and need help  Ask your healthcare provider for more information  · Stay active  Exercise can help strengthen your muscles and improve your balance  Your healthcare provider may recommend water aerobics or walking  He or she may also recommend physical therapy to improve your coordination  Never start an exercise program without talking to your healthcare provider first      · Manage your medical conditions  Keep all appointments with your healthcare providers  Visit your eye doctor as directed  Home safety tips:   · Add items to prevent falls in the bathroom  Put nonslip strips on your bath or shower floor to prevent you from slipping   Use a bath mat if you do not have carpet in the bathroom  This will prevent you from falling when you step out of the bath or shower  Use a shower seat so you do not need to stand while you shower  Sit on the toilet or a chair in your bathroom to dry yourself and put on clothing  This will prevent you from losing your balance from drying or dressing yourself while you are standing  · Keep paths clear  Remove books, shoes, and other objects from walkways and stairs  Place cords for telephones and lamps out of the way so that you do not need to walk over them  Tape them down if you cannot move them  Remove small rugs  If you cannot remove a rug, secure it with double-sided tape  This will prevent you from tripping  · Install bright lights in your home  Use night lights to help light paths to the bathroom or kitchen  Always turn on the light before you start walking  · Keep items you use often on shelves within reach  Do not use a step stool to help you reach an item  · Paint or place reflective tape on the edges of your stairs  This will help you see the stairs better  Follow up with your healthcare provider as directed:  Write down your questions so you remember to ask them during your visits  © 2017 [address] Information is for End User's use only and may not be sold, redistributed or otherwise used for commercial purposes  All illustrations and images included in CareNotes® are the copyrighted property of A D A M , Inc  or Man [employer]  The above information is an  only  It is not intended as medical advice for individual conditions or treatments  Talk to your doctor, nurse or pharmacist before following any medical regimen to see if it is safe and effective for you  Prevención de caídas   CUIDADO AMBULATORIO:   La prevención de caídas  incluye formas de hacer más seguro groves hogar y [place]  También incluye cómo moverse más cuidadosamente para evitar mulu caída  Las condiciones médicas que provocan cambios en la presión arterial, la visión o la fuerza muscular y la coordinación pueden llegar a aumentar groves riesgo de caídas  [employer] pueden aumentar groves riesgo de caídas si le provocan mareos, debilidad o somnolencia  Llame al 911 o pídale a alguien más que lo jamil si:   · Se ha caído y está inconsciente  · Se ha caído y no puede  parte de groves cuerpo  Pregúntele a groves Dorisann Rodriguez vitaminas y minerales son adecuados para usted  · Se ha caído y tiene dolor en el cuerpo o dolor de [place]  · Usted tiene preguntas o inquietudes acerca de groves condición o cuidado  Recomendaciones para prevenir caídas:   · Póngase de pie o siéntese despacio  [place] puede ayudarlo a mantener groves equilibrio y prevenir mulu caída  · Use dispositivos de apoyo lynette se le indique  Groves médico le puede recomendar que use un bastón o un caminador para que lo asista en groves equilibrio  Es posible que necesite que le instalen barandas en el baño cerca al inodoro o en la ducha  · Use calzado que le quede dion y tenga suelas antideslizantes  Use zapatos dentro y fuera de casa  Utilice pantunflas con mulu suela de buen agarre  No use zapatos con tacones altos  · Utilizar un dispositivo de [employer]  [place] es un dispositivo que puede llevar puesto y le permite llamar al [address] en reuben que se Rodo Radha y necesite ayuda  Pídale a groves médico más información  · Manténgase activo  El ejercicio puede ayudar a fortalecer los músculos y mejorar groves equilibrio  Groves médico le puede recomendar hacer ejercicios aeróbicos acuáticos o caminar  También le puede recomendar la fisioterapia para mejorar groves coordinación  Nunca comience un programa de ejercicios sin consultarlo mimi con groves médico      · Controle saurav afecciones médicas  [place] con todas las citas con saurav médicos  Visite al [employer] se le ha indicado         Recomendaciones para la seguridad en el hogar:   · [employer] para prevenir caídas en el baño  Coloque tiras antideslizantes en el piso de la ducha o de la bañera para evitar resbalones  Use mulu alfombra de baño si no tiene alfombra en el cuarto de baño  [place] evitará que se caiga cuando sale de la ducha o la bañera  Use un asiento de ducha de modo que no necesitará ponerse de pie [place] se [place]  [place] en el inodoro o en mulu silla en el cuarto de baño para secarse y vestirse  [place] impedirá que pierda el equilibrio [place] se seca o se viste estando de pie  · Mantener los pasillos despejados  Despeje las vías y las escaleras por donde camina retirando los libros, los zapatos u otros objetos  Coloque los cables del teléfono y las lámparas fuera de fletcher adela para que no tenga que caminar Maurita Huge  Si no puede moverlos, sujételos con cinta adhesiva  Retire los tapetes pequeños  Si no puede quitar un tapete, sujételo con cinta de doble ángel  Lo cual evitará que se tropiece con éstos  · Instale mulu buena iluminación en fletcher hogar  Use lamparillas de noche para ayudar a iluminar los pasillos al baño o a la cocina  Siempre encienda la rodrigo antes de empezar a caminar  · Mantenga los objetos que [place] con frecuencia en estantes dentro de fletcher alcance  No use un banquito para intentar alcanzar un elemento  · Pinte o coloque cinta reflectiva en los bordes de las escaleras  Lo cual puede ayudarle a krishan mejor las escaleras  Acuda a saurav consultas de control con fletcher médico según le indicaron  Anote saurav preguntas para que se acuerde de hacerlas ariadne saurav visitas  © 2017 [address] Information is for End User's use only and may not be sold, redistributed or otherwise used for commercial purposes  All illustrations and images included in CareNotes® are the copyrighted property of A D A M , Inc  or [employer]  Esta información es sólo para uso en educación  Fletcher intención no es darle un consejo médico sobre enfermedades o tratamientos  Colsulte con groves Jayda Karina farmacéutico antes de seguir cualquier régimen médico para saber si es seguro y efectivo para usted

## 2020-06-04 ENCOUNTER — OFFICE VISIT (OUTPATIENT)
Dept: FAMILY MEDICINE CLINIC | Facility: CLINIC | Age: 85
End: 2020-06-04
Payer: MEDICARE

## 2020-06-04 VITALS
DIASTOLIC BLOOD PRESSURE: 60 MMHG | RESPIRATION RATE: 16 BRPM | TEMPERATURE: 97.9 F | WEIGHT: 172 LBS | OXYGEN SATURATION: 98 % | SYSTOLIC BLOOD PRESSURE: 130 MMHG | HEIGHT: 63 IN | HEART RATE: 88 BPM | BODY MASS INDEX: 30.48 KG/M2

## 2020-06-04 DIAGNOSIS — E11.9 TYPE 2 DIABETES MELLITUS WITHOUT COMPLICATION, WITHOUT LONG-TERM CURRENT USE OF INSULIN (HCC): ICD-10-CM

## 2020-06-04 DIAGNOSIS — I50.21 ACUTE SYSTOLIC CONGESTIVE HEART FAILURE (HCC): ICD-10-CM

## 2020-06-04 DIAGNOSIS — R06.02 SOB (SHORTNESS OF BREATH): ICD-10-CM

## 2020-06-04 DIAGNOSIS — Z00.01 ENCOUNTER FOR GENERAL ADULT MEDICAL EXAMINATION WITH ABNORMAL FINDINGS: Primary | ICD-10-CM

## 2020-06-04 DIAGNOSIS — E55.9 VITAMIN D DEFICIENCY: ICD-10-CM

## 2020-06-04 DIAGNOSIS — D64.9 ANEMIA, UNSPECIFIED TYPE: ICD-10-CM

## 2020-06-04 PROCEDURE — 3075F SYST BP GE 130 - 139MM HG: CPT | Performed by: FAMILY MEDICINE

## 2020-06-04 PROCEDURE — 3008F BODY MASS INDEX DOCD: CPT | Performed by: FAMILY MEDICINE

## 2020-06-04 PROCEDURE — 99214 OFFICE O/P EST MOD 30 MIN: CPT | Performed by: FAMILY MEDICINE

## 2020-06-04 PROCEDURE — 1170F FXNL STATUS ASSESSED: CPT | Performed by: FAMILY MEDICINE

## 2020-06-04 PROCEDURE — 3044F HG A1C LEVEL LT 7.0%: CPT | Performed by: FAMILY MEDICINE

## 2020-06-04 PROCEDURE — 1125F AMNT PAIN NOTED PAIN PRSNT: CPT | Performed by: FAMILY MEDICINE

## 2020-06-04 PROCEDURE — 1036F TOBACCO NON-USER: CPT | Performed by: FAMILY MEDICINE

## 2020-06-04 PROCEDURE — 1160F RVW MEDS BY RX/DR IN RCRD: CPT | Performed by: FAMILY MEDICINE

## 2020-06-04 PROCEDURE — 4040F PNEUMOC VAC/ADMIN/RCVD: CPT | Performed by: FAMILY MEDICINE

## 2020-06-04 PROCEDURE — 3078F DIAST BP <80 MM HG: CPT | Performed by: FAMILY MEDICINE

## 2020-06-04 PROCEDURE — G0439 PPPS, SUBSEQ VISIT: HCPCS | Performed by: FAMILY MEDICINE

## 2020-06-04 PROCEDURE — 83036 HEMOGLOBIN GLYCOSYLATED A1C: CPT | Performed by: FAMILY MEDICINE

## 2020-06-04 RX ORDER — FUROSEMIDE 20 MG/1
20 TABLET ORAL DAILY
Qty: 30 TABLET | Refills: 5 | Status: SHIPPED | OUTPATIENT
Start: 2020-06-04 | End: 2020-06-05

## 2020-06-04 RX ORDER — ERGOCALCIFEROL 1.25 MG/1
50000 CAPSULE ORAL WEEKLY
Qty: 4 CAPSULE | Refills: 5 | Status: SHIPPED | OUTPATIENT
Start: 2020-06-04 | End: 2020-09-09 | Stop reason: ALTCHOICE

## 2020-06-05 ENCOUNTER — HOSPITAL ENCOUNTER (EMERGENCY)
Facility: HOSPITAL | Age: 85
End: 2020-06-05
Attending: EMERGENCY MEDICINE | Admitting: EMERGENCY MEDICINE
Payer: MEDICARE

## 2020-06-05 ENCOUNTER — TELEPHONE (OUTPATIENT)
Dept: FAMILY MEDICINE CLINIC | Facility: CLINIC | Age: 85
End: 2020-06-05

## 2020-06-05 ENCOUNTER — APPOINTMENT (EMERGENCY)
Dept: RADIOLOGY | Facility: HOSPITAL | Age: 85
End: 2020-06-05
Payer: MEDICARE

## 2020-06-05 ENCOUNTER — APPOINTMENT (EMERGENCY)
Dept: CT IMAGING | Facility: HOSPITAL | Age: 85
End: 2020-06-05
Payer: MEDICARE

## 2020-06-05 ENCOUNTER — HOSPITAL ENCOUNTER (INPATIENT)
Facility: HOSPITAL | Age: 85
LOS: 5 days | Discharge: HOME HEALTH | DRG: 202 | End: 2020-06-10
Attending: INTERNAL MEDICINE | Admitting: INTERNAL MEDICINE
Payer: MEDICARE

## 2020-06-05 VITALS
RESPIRATION RATE: 16 BRPM | OXYGEN SATURATION: 100 % | WEIGHT: 175 LBS | BODY MASS INDEX: 31 KG/M2 | TEMPERATURE: 98.1 F | DIASTOLIC BLOOD PRESSURE: 76 MMHG | HEART RATE: 78 BPM | SYSTOLIC BLOOD PRESSURE: 143 MMHG

## 2020-06-05 DIAGNOSIS — I51.7 ATRIAL ENLARGEMENT, BILATERAL: ICD-10-CM

## 2020-06-05 DIAGNOSIS — R93.89 ABNORMAL CHEST CT: ICD-10-CM

## 2020-06-05 DIAGNOSIS — J98.11 ATELECTASIS: ICD-10-CM

## 2020-06-05 DIAGNOSIS — R22.2 MASS IN CHEST: Primary | ICD-10-CM

## 2020-06-05 DIAGNOSIS — J98.09 LESION OF BRONCHUS: Primary | ICD-10-CM

## 2020-06-05 LAB
ALBUMIN SERPL BCP-MCNC: 4.3 G/DL (ref 3–5.2)
ALP SERPL-CCNC: 100 U/L (ref 43–122)
ALT SERPL W P-5'-P-CCNC: 11 U/L (ref 9–52)
ANION GAP SERPL CALCULATED.3IONS-SCNC: 8 MMOL/L (ref 5–14)
AST SERPL W P-5'-P-CCNC: 26 U/L (ref 14–36)
BACTERIA UR QL AUTO: ABNORMAL /HPF
BASOPHILS # BLD AUTO: 0.05 THOUSAND/UL (ref 0–0.1)
BASOPHILS NFR MAR MANUAL: 1 % (ref 0–1)
BILIRUB SERPL-MCNC: 0.6 MG/DL
BILIRUB UR QL STRIP: NEGATIVE
BUN SERPL-MCNC: 23 MG/DL (ref 5–25)
CALCIUM SERPL-MCNC: 9.9 MG/DL (ref 8.4–10.2)
CHLORIDE SERPL-SCNC: 103 MMOL/L (ref 97–108)
CLARITY UR: CLEAR
CO2 SERPL-SCNC: 29 MMOL/L (ref 22–30)
COLOR UR: ABNORMAL
CREAT SERPL-MCNC: 0.71 MG/DL (ref 0.6–1.2)
ERYTHROCYTE [DISTWIDTH] IN BLOOD BY AUTOMATED COUNT: 15.8 %
GFR SERPL CREATININE-BSD FRML MDRD: 75 ML/MIN/1.73SQ M
GLUCOSE SERPL-MCNC: 154 MG/DL (ref 70–99)
GLUCOSE SERPL-MCNC: 165 MG/DL (ref 65–140)
GLUCOSE UR STRIP-MCNC: NEGATIVE MG/DL
HCT VFR BLD AUTO: 33.5 % (ref 36–46)
HGB BLD-MCNC: 11.3 G/DL (ref 12–16)
HGB UR QL STRIP.AUTO: 25
HYPERCHROMIA BLD QL SMEAR: PRESENT
KETONES UR STRIP-MCNC: NEGATIVE MG/DL
LEUKOCYTE ESTERASE UR QL STRIP: 25
LYMPHOCYTES # BLD AUTO: 1.38 THOUSAND/UL (ref 0.5–4)
LYMPHOCYTES # BLD AUTO: 26 % (ref 25–45)
MAGNESIUM SERPL-MCNC: 2 MG/DL (ref 1.6–2.3)
MCH RBC QN AUTO: 30.7 PG (ref 26.8–34.3)
MCHC RBC AUTO-ENTMCNC: 33.6 G/DL (ref 31.4–37.4)
MCV RBC AUTO: 91 FL (ref 80–100)
MONOCYTES # BLD AUTO: 0.9 THOUSAND/UL (ref 0.2–0.9)
MONOCYTES NFR BLD AUTO: 17 % (ref 1–10)
NEUTS BAND NFR BLD MANUAL: 1 % (ref 0–8)
NEUTS SEG # BLD: 2.92 THOUSAND/UL (ref 1.8–7.8)
NEUTS SEG NFR BLD AUTO: 54 %
NITRITE UR QL STRIP: NEGATIVE
NON-SQ EPI CELLS URNS QL MICRO: ABNORMAL /HPF
NT-PROBNP SERPL-MCNC: 295 PG/ML (ref 0–299)
PH UR STRIP.AUTO: 8 [PH]
PHOSPHATE SERPL-MCNC: 3.7 MG/DL (ref 2.5–4.8)
PLATELET # BLD AUTO: 249 THOUSANDS/UL (ref 150–450)
PLATELET BLD QL SMEAR: ADEQUATE
PMV BLD AUTO: 9.2 FL (ref 8.9–12.7)
POIKILOCYTOSIS BLD QL SMEAR: PRESENT
POTASSIUM SERPL-SCNC: 4.4 MMOL/L (ref 3.6–5)
PROT SERPL-MCNC: 7.7 G/DL (ref 5.9–8.4)
PROT UR STRIP-MCNC: NEGATIVE MG/DL
RBC # BLD AUTO: 3.67 MILLION/UL (ref 4–5.2)
RBC #/AREA URNS AUTO: ABNORMAL /HPF
RBC MORPH BLD: ABNORMAL
SARS-COV-2 RNA RESP QL NAA+PROBE: NEGATIVE
SL AMB POCT HEMOGLOBIN AIC: 6.1 (ref ?–6.5)
SODIUM SERPL-SCNC: 140 MMOL/L (ref 137–147)
SP GR UR STRIP.AUTO: 1.01 (ref 1–1.04)
TOTAL CELLS COUNTED SPEC: 100
TROPONIN I SERPL-MCNC: 0.01 NG/ML (ref 0–0.03)
UROBILINOGEN UA: NEGATIVE MG/DL
VARIANT LYMPHS # BLD AUTO: 1 % (ref 0–0)
WBC # BLD AUTO: 5.3 THOUSAND/UL (ref 4.31–10.16)
WBC #/AREA URNS AUTO: ABNORMAL /HPF

## 2020-06-05 PROCEDURE — 36415 COLL VENOUS BLD VENIPUNCTURE: CPT | Performed by: PHYSICIAN ASSISTANT

## 2020-06-05 PROCEDURE — 99223 1ST HOSP IP/OBS HIGH 75: CPT | Performed by: INTERNAL MEDICINE

## 2020-06-05 PROCEDURE — 83735 ASSAY OF MAGNESIUM: CPT | Performed by: PHYSICIAN ASSISTANT

## 2020-06-05 PROCEDURE — 80053 COMPREHEN METABOLIC PANEL: CPT | Performed by: PHYSICIAN ASSISTANT

## 2020-06-05 PROCEDURE — 84484 ASSAY OF TROPONIN QUANT: CPT | Performed by: PHYSICIAN ASSISTANT

## 2020-06-05 PROCEDURE — 82948 REAGENT STRIP/BLOOD GLUCOSE: CPT

## 2020-06-05 PROCEDURE — 84100 ASSAY OF PHOSPHORUS: CPT | Performed by: PHYSICIAN ASSISTANT

## 2020-06-05 PROCEDURE — 85007 BL SMEAR W/DIFF WBC COUNT: CPT | Performed by: PHYSICIAN ASSISTANT

## 2020-06-05 PROCEDURE — 71260 CT THORAX DX C+: CPT

## 2020-06-05 PROCEDURE — 81001 URINALYSIS AUTO W/SCOPE: CPT | Performed by: PHYSICIAN ASSISTANT

## 2020-06-05 PROCEDURE — 99222 1ST HOSP IP/OBS MODERATE 55: CPT | Performed by: INTERNAL MEDICINE

## 2020-06-05 PROCEDURE — 93000 ELECTROCARDIOGRAM COMPLETE: CPT | Performed by: FAMILY MEDICINE

## 2020-06-05 PROCEDURE — 85027 COMPLETE CBC AUTOMATED: CPT | Performed by: PHYSICIAN ASSISTANT

## 2020-06-05 PROCEDURE — 87635 SARS-COV-2 COVID-19 AMP PRB: CPT | Performed by: PHYSICIAN ASSISTANT

## 2020-06-05 PROCEDURE — 99284 EMERGENCY DEPT VISIT MOD MDM: CPT | Performed by: PHYSICIAN ASSISTANT

## 2020-06-05 PROCEDURE — 93005 ELECTROCARDIOGRAM TRACING: CPT

## 2020-06-05 PROCEDURE — 99285 EMERGENCY DEPT VISIT HI MDM: CPT

## 2020-06-05 PROCEDURE — 83880 ASSAY OF NATRIURETIC PEPTIDE: CPT | Performed by: PHYSICIAN ASSISTANT

## 2020-06-05 PROCEDURE — 71045 X-RAY EXAM CHEST 1 VIEW: CPT

## 2020-06-05 RX ORDER — PANTOPRAZOLE SODIUM 40 MG/1
40 TABLET, DELAYED RELEASE ORAL
Status: DISCONTINUED | OUTPATIENT
Start: 2020-06-06 | End: 2020-06-10 | Stop reason: HOSPADM

## 2020-06-05 RX ORDER — ALLOPURINOL 100 MG/1
100 TABLET ORAL DAILY
Status: DISCONTINUED | OUTPATIENT
Start: 2020-06-06 | End: 2020-06-10 | Stop reason: HOSPADM

## 2020-06-05 RX ORDER — ALBUTEROL SULFATE 90 UG/1
2 AEROSOL, METERED RESPIRATORY (INHALATION) EVERY 6 HOURS PRN
Status: DISCONTINUED | OUTPATIENT
Start: 2020-06-05 | End: 2020-06-10 | Stop reason: HOSPADM

## 2020-06-05 RX ORDER — LOSARTAN POTASSIUM 25 MG/1
25 TABLET ORAL DAILY
Status: DISCONTINUED | OUTPATIENT
Start: 2020-06-06 | End: 2020-06-10 | Stop reason: HOSPADM

## 2020-06-05 RX ORDER — ACETAMINOPHEN 325 MG/1
650 TABLET ORAL EVERY 6 HOURS PRN
Status: DISCONTINUED | OUTPATIENT
Start: 2020-06-05 | End: 2020-06-10 | Stop reason: HOSPADM

## 2020-06-05 RX ORDER — FUROSEMIDE 20 MG/1
20 TABLET ORAL DAILY
Status: DISCONTINUED | OUTPATIENT
Start: 2020-06-06 | End: 2020-06-10 | Stop reason: HOSPADM

## 2020-06-05 RX ORDER — FUROSEMIDE 20 MG/1
TABLET ORAL
Qty: 90 TABLET | Refills: 3 | Status: SHIPPED | OUTPATIENT
Start: 2020-06-05 | End: 2020-07-06 | Stop reason: ALTCHOICE

## 2020-06-05 RX ADMIN — ACETAMINOPHEN 650 MG: 325 TABLET ORAL at 20:25

## 2020-06-05 RX ADMIN — IOHEXOL 100 ML: 350 INJECTION, SOLUTION INTRAVENOUS at 12:08

## 2020-06-06 LAB
ANION GAP SERPL CALCULATED.3IONS-SCNC: 12 MMOL/L (ref 4–13)
ATRIAL RATE: 88 BPM
BASOPHILS # BLD AUTO: 0.03 THOUSANDS/ΜL (ref 0–0.1)
BASOPHILS NFR BLD AUTO: 1 % (ref 0–1)
BUN SERPL-MCNC: 18 MG/DL (ref 5–25)
CALCIUM SERPL-MCNC: 9.5 MG/DL (ref 8.3–10.1)
CHLORIDE SERPL-SCNC: 105 MMOL/L (ref 100–108)
CO2 SERPL-SCNC: 26 MMOL/L (ref 21–32)
CREAT SERPL-MCNC: 0.79 MG/DL (ref 0.6–1.3)
EOSINOPHIL # BLD AUTO: 0.08 THOUSAND/ΜL (ref 0–0.61)
EOSINOPHIL NFR BLD AUTO: 1 % (ref 0–6)
ERYTHROCYTE [DISTWIDTH] IN BLOOD BY AUTOMATED COUNT: 14.8 % (ref 11.6–15.1)
GFR SERPL CREATININE-BSD FRML MDRD: 66 ML/MIN/1.73SQ M
GLUCOSE SERPL-MCNC: 121 MG/DL (ref 65–140)
GLUCOSE SERPL-MCNC: 93 MG/DL (ref 65–140)
HCT VFR BLD AUTO: 33.7 % (ref 34.8–46.1)
HGB BLD-MCNC: 10.8 G/DL (ref 11.5–15.4)
IMM GRANULOCYTES # BLD AUTO: 0.07 THOUSAND/UL (ref 0–0.2)
IMM GRANULOCYTES NFR BLD AUTO: 1 % (ref 0–2)
LYMPHOCYTES # BLD AUTO: 1.22 THOUSANDS/ΜL (ref 0.6–4.47)
LYMPHOCYTES NFR BLD AUTO: 20 % (ref 14–44)
MCH RBC QN AUTO: 30.5 PG (ref 26.8–34.3)
MCHC RBC AUTO-ENTMCNC: 32 G/DL (ref 31.4–37.4)
MCV RBC AUTO: 95 FL (ref 82–98)
MONOCYTES # BLD AUTO: 1.62 THOUSAND/ΜL (ref 0.17–1.22)
MONOCYTES NFR BLD AUTO: 27 % (ref 4–12)
NEUTROPHILS # BLD AUTO: 3.08 THOUSANDS/ΜL (ref 1.85–7.62)
NEUTS SEG NFR BLD AUTO: 50 % (ref 43–75)
NRBC BLD AUTO-RTO: 0 /100 WBCS
P AXIS: 74 DEGREES
PLATELET # BLD AUTO: 227 THOUSANDS/UL (ref 149–390)
PMV BLD AUTO: 10.6 FL (ref 8.9–12.7)
POTASSIUM SERPL-SCNC: 3.9 MMOL/L (ref 3.5–5.3)
PR INTERVAL: 168 MS
QRS AXIS: -22 DEGREES
QRSD INTERVAL: 76 MS
QT INTERVAL: 368 MS
QTC INTERVAL: 445 MS
RBC # BLD AUTO: 3.54 MILLION/UL (ref 3.81–5.12)
SODIUM SERPL-SCNC: 143 MMOL/L (ref 136–145)
T WAVE AXIS: 46 DEGREES
VENTRICULAR RATE: 88 BPM
WBC # BLD AUTO: 6.1 THOUSAND/UL (ref 4.31–10.16)

## 2020-06-06 PROCEDURE — 99232 SBSQ HOSP IP/OBS MODERATE 35: CPT | Performed by: INTERNAL MEDICINE

## 2020-06-06 PROCEDURE — 93010 ELECTROCARDIOGRAM REPORT: CPT | Performed by: INTERNAL MEDICINE

## 2020-06-06 PROCEDURE — 85025 COMPLETE CBC W/AUTO DIFF WBC: CPT | Performed by: INTERNAL MEDICINE

## 2020-06-06 PROCEDURE — 80048 BASIC METABOLIC PNL TOTAL CA: CPT | Performed by: INTERNAL MEDICINE

## 2020-06-06 PROCEDURE — 82948 REAGENT STRIP/BLOOD GLUCOSE: CPT

## 2020-06-06 RX ADMIN — ALLOPURINOL 100 MG: 100 TABLET ORAL at 08:12

## 2020-06-06 RX ADMIN — FUROSEMIDE 20 MG: 20 TABLET ORAL at 08:12

## 2020-06-06 RX ADMIN — PANTOPRAZOLE SODIUM 40 MG: 40 TABLET, DELAYED RELEASE ORAL at 06:14

## 2020-06-06 RX ADMIN — LOSARTAN POTASSIUM 25 MG: 25 TABLET, FILM COATED ORAL at 08:12

## 2020-06-06 RX ADMIN — ACETAMINOPHEN 650 MG: 325 TABLET ORAL at 16:38

## 2020-06-06 RX ADMIN — ENOXAPARIN SODIUM 40 MG: 40 INJECTION SUBCUTANEOUS at 08:17

## 2020-06-07 PROCEDURE — 99232 SBSQ HOSP IP/OBS MODERATE 35: CPT | Performed by: INTERNAL MEDICINE

## 2020-06-07 RX ADMIN — ACETAMINOPHEN 650 MG: 325 TABLET ORAL at 08:06

## 2020-06-07 RX ADMIN — ALLOPURINOL 100 MG: 100 TABLET ORAL at 08:06

## 2020-06-07 RX ADMIN — ENOXAPARIN SODIUM 40 MG: 40 INJECTION SUBCUTANEOUS at 08:07

## 2020-06-07 RX ADMIN — PANTOPRAZOLE SODIUM 40 MG: 40 TABLET, DELAYED RELEASE ORAL at 05:26

## 2020-06-07 RX ADMIN — LOSARTAN POTASSIUM 25 MG: 25 TABLET, FILM COATED ORAL at 08:06

## 2020-06-07 RX ADMIN — FUROSEMIDE 20 MG: 20 TABLET ORAL at 08:06

## 2020-06-08 ENCOUNTER — PREP FOR PROCEDURE (OUTPATIENT)
Dept: OTHER | Facility: HOSPITAL | Age: 85
End: 2020-06-08

## 2020-06-08 ENCOUNTER — ANESTHESIA EVENT (INPATIENT)
Dept: GASTROENTEROLOGY | Facility: HOSPITAL | Age: 85
DRG: 202 | End: 2020-06-08
Payer: MEDICARE

## 2020-06-08 DIAGNOSIS — R93.89 ABNORMAL CHEST CT: Primary | ICD-10-CM

## 2020-06-08 LAB
ANION GAP SERPL CALCULATED.3IONS-SCNC: 8 MMOL/L (ref 4–13)
APTT PPP: 28 SECONDS (ref 23–37)
BASOPHILS # BLD AUTO: 0.02 THOUSANDS/ΜL (ref 0–0.1)
BASOPHILS NFR BLD AUTO: 0 % (ref 0–1)
BUN SERPL-MCNC: 33 MG/DL (ref 5–25)
CALCIUM SERPL-MCNC: 9.3 MG/DL (ref 8.3–10.1)
CHLORIDE SERPL-SCNC: 107 MMOL/L (ref 100–108)
CO2 SERPL-SCNC: 28 MMOL/L (ref 21–32)
CREAT SERPL-MCNC: 0.8 MG/DL (ref 0.6–1.3)
EOSINOPHIL # BLD AUTO: 0.09 THOUSAND/ΜL (ref 0–0.61)
EOSINOPHIL NFR BLD AUTO: 2 % (ref 0–6)
ERYTHROCYTE [DISTWIDTH] IN BLOOD BY AUTOMATED COUNT: 14.9 % (ref 11.6–15.1)
GFR SERPL CREATININE-BSD FRML MDRD: 65 ML/MIN/1.73SQ M
GLUCOSE SERPL-MCNC: 106 MG/DL (ref 65–140)
GLUCOSE SERPL-MCNC: 115 MG/DL (ref 65–140)
HCT VFR BLD AUTO: 33.4 % (ref 34.8–46.1)
HGB BLD-MCNC: 10.6 G/DL (ref 11.5–15.4)
IMM GRANULOCYTES # BLD AUTO: 0.07 THOUSAND/UL (ref 0–0.2)
IMM GRANULOCYTES NFR BLD AUTO: 1 % (ref 0–2)
INR PPP: 1.04 (ref 0.84–1.19)
LYMPHOCYTES # BLD AUTO: 0.99 THOUSANDS/ΜL (ref 0.6–4.47)
LYMPHOCYTES NFR BLD AUTO: 19 % (ref 14–44)
MCH RBC QN AUTO: 30.5 PG (ref 26.8–34.3)
MCHC RBC AUTO-ENTMCNC: 31.7 G/DL (ref 31.4–37.4)
MCV RBC AUTO: 96 FL (ref 82–98)
MONOCYTES # BLD AUTO: 1.37 THOUSAND/ΜL (ref 0.17–1.22)
MONOCYTES NFR BLD AUTO: 26 % (ref 4–12)
NEUTROPHILS # BLD AUTO: 2.75 THOUSANDS/ΜL (ref 1.85–7.62)
NEUTS SEG NFR BLD AUTO: 52 % (ref 43–75)
NRBC BLD AUTO-RTO: 0 /100 WBCS
PLATELET # BLD AUTO: 222 THOUSANDS/UL (ref 149–390)
PMV BLD AUTO: 10.9 FL (ref 8.9–12.7)
POTASSIUM SERPL-SCNC: 3.7 MMOL/L (ref 3.5–5.3)
PROTHROMBIN TIME: 13.7 SECONDS (ref 11.6–14.5)
RBC # BLD AUTO: 3.48 MILLION/UL (ref 3.81–5.12)
SODIUM SERPL-SCNC: 143 MMOL/L (ref 136–145)
WBC # BLD AUTO: 5.29 THOUSAND/UL (ref 4.31–10.16)

## 2020-06-08 PROCEDURE — 82948 REAGENT STRIP/BLOOD GLUCOSE: CPT

## 2020-06-08 PROCEDURE — 85610 PROTHROMBIN TIME: CPT | Performed by: INTERNAL MEDICINE

## 2020-06-08 PROCEDURE — 99232 SBSQ HOSP IP/OBS MODERATE 35: CPT | Performed by: INTERNAL MEDICINE

## 2020-06-08 PROCEDURE — 85025 COMPLETE CBC W/AUTO DIFF WBC: CPT | Performed by: INTERNAL MEDICINE

## 2020-06-08 PROCEDURE — 85730 THROMBOPLASTIN TIME PARTIAL: CPT | Performed by: INTERNAL MEDICINE

## 2020-06-08 PROCEDURE — 80048 BASIC METABOLIC PNL TOTAL CA: CPT | Performed by: INTERNAL MEDICINE

## 2020-06-08 RX ORDER — LIDOCAINE 50 MG/G
2 PATCH TOPICAL DAILY
Status: DISCONTINUED | OUTPATIENT
Start: 2020-06-08 | End: 2020-06-10 | Stop reason: HOSPADM

## 2020-06-08 RX ADMIN — LIDOCAINE 2 PATCH: 50 PATCH TOPICAL at 17:42

## 2020-06-08 RX ADMIN — PANTOPRAZOLE SODIUM 40 MG: 40 TABLET, DELAYED RELEASE ORAL at 05:50

## 2020-06-08 RX ADMIN — FUROSEMIDE 20 MG: 20 TABLET ORAL at 08:31

## 2020-06-08 RX ADMIN — LOSARTAN POTASSIUM 25 MG: 25 TABLET, FILM COATED ORAL at 08:31

## 2020-06-08 RX ADMIN — ENOXAPARIN SODIUM 40 MG: 40 INJECTION SUBCUTANEOUS at 08:31

## 2020-06-08 RX ADMIN — ALLOPURINOL 100 MG: 100 TABLET ORAL at 08:31

## 2020-06-09 ENCOUNTER — APPOINTMENT (INPATIENT)
Dept: NON INVASIVE DIAGNOSTICS | Facility: HOSPITAL | Age: 85
DRG: 202 | End: 2020-06-09
Payer: MEDICARE

## 2020-06-09 ENCOUNTER — ANESTHESIA (INPATIENT)
Dept: GASTROENTEROLOGY | Facility: HOSPITAL | Age: 85
DRG: 202 | End: 2020-06-09
Payer: MEDICARE

## 2020-06-09 ENCOUNTER — APPOINTMENT (INPATIENT)
Dept: GASTROENTEROLOGY | Facility: HOSPITAL | Age: 85
DRG: 202 | End: 2020-06-09
Payer: MEDICARE

## 2020-06-09 PROBLEM — Z86.11 HISTORY OF TUBERCULOSIS: Status: ACTIVE | Noted: 2020-03-10

## 2020-06-09 PROBLEM — N17.9 AKI (ACUTE KIDNEY INJURY) (HCC): Status: ACTIVE | Noted: 2020-02-02

## 2020-06-09 PROBLEM — J98.11 ATELECTASIS OF LEFT LUNG: Status: ACTIVE | Noted: 2020-02-02

## 2020-06-09 PROBLEM — J18.9 PNEUMONIA: Status: RESOLVED | Noted: 2019-07-31 | Resolved: 2020-06-09

## 2020-06-09 PROBLEM — J18.9 PNEUMONIA OF LEFT LOWER LOBE DUE TO INFECTIOUS ORGANISM: Status: RESOLVED | Noted: 2019-03-25 | Resolved: 2020-06-09

## 2020-06-09 PROBLEM — J10.1 INFLUENZA A: Status: ACTIVE | Noted: 2020-02-03

## 2020-06-09 PROBLEM — R74.8 ELEVATED ALKALINE PHOSPHATASE LEVEL: Status: ACTIVE | Noted: 2020-02-02

## 2020-06-09 PROBLEM — A41.9 SEPSIS (HCC): Status: ACTIVE | Noted: 2020-02-03

## 2020-06-09 PROBLEM — J20.9 ACUTE BRONCHITIS: Status: ACTIVE | Noted: 2020-02-02

## 2020-06-09 PROBLEM — D64.9 NORMOCYTIC ANEMIA: Status: ACTIVE | Noted: 2020-02-02

## 2020-06-09 PROCEDURE — 87281 PNEUMOCYSTIS CARINII AG IF: CPT | Performed by: INTERNAL MEDICINE

## 2020-06-09 PROCEDURE — 94640 AIRWAY INHALATION TREATMENT: CPT

## 2020-06-09 PROCEDURE — 31629 BRONCHOSCOPY/NEEDLE BX EACH: CPT | Performed by: INTERNAL MEDICINE

## 2020-06-09 PROCEDURE — 0BDG8ZX EXTRACTION OF LEFT UPPER LUNG LOBE, VIA NATURAL OR ARTIFICIAL OPENING ENDOSCOPIC, DIAGNOSTIC: ICD-10-PCS | Performed by: INTERNAL MEDICINE

## 2020-06-09 PROCEDURE — 99232 SBSQ HOSP IP/OBS MODERATE 35: CPT | Performed by: INTERNAL MEDICINE

## 2020-06-09 PROCEDURE — 93306 TTE W/DOPPLER COMPLETE: CPT | Performed by: INTERNAL MEDICINE

## 2020-06-09 PROCEDURE — 88305 TISSUE EXAM BY PATHOLOGIST: CPT | Performed by: PATHOLOGY

## 2020-06-09 PROCEDURE — 88112 CYTOPATH CELL ENHANCE TECH: CPT | Performed by: PATHOLOGY

## 2020-06-09 PROCEDURE — 87102 FUNGUS ISOLATION CULTURE: CPT | Performed by: INTERNAL MEDICINE

## 2020-06-09 PROCEDURE — 88312 SPECIAL STAINS GROUP 1: CPT | Performed by: PATHOLOGY

## 2020-06-09 PROCEDURE — 87206 SMEAR FLUORESCENT/ACID STAI: CPT | Performed by: INTERNAL MEDICINE

## 2020-06-09 PROCEDURE — 31623 DX BRONCHOSCOPE/BRUSH: CPT | Performed by: INTERNAL MEDICINE

## 2020-06-09 PROCEDURE — 89051 BODY FLUID CELL COUNT: CPT | Performed by: PATHOLOGY

## 2020-06-09 PROCEDURE — 88342 IMHCHEM/IMCYTCHM 1ST ANTB: CPT | Performed by: PATHOLOGY

## 2020-06-09 PROCEDURE — 87116 MYCOBACTERIA CULTURE: CPT | Performed by: INTERNAL MEDICINE

## 2020-06-09 PROCEDURE — 88341 IMHCHEM/IMCYTCHM EA ADD ANTB: CPT | Performed by: PATHOLOGY

## 2020-06-09 PROCEDURE — 87015 SPECIMEN INFECT AGNT CONCNTJ: CPT | Performed by: INTERNAL MEDICINE

## 2020-06-09 PROCEDURE — NC001 PR NO CHARGE: Performed by: INTERNAL MEDICINE

## 2020-06-09 PROCEDURE — 87070 CULTURE OTHR SPECIMN AEROBIC: CPT | Performed by: INTERNAL MEDICINE

## 2020-06-09 PROCEDURE — 94760 N-INVAS EAR/PLS OXIMETRY 1: CPT

## 2020-06-09 PROCEDURE — 87252 VIRUS INOCULATION TISSUE: CPT | Performed by: INTERNAL MEDICINE

## 2020-06-09 PROCEDURE — 31624 DX BRONCHOSCOPE/LAVAGE: CPT | Performed by: INTERNAL MEDICINE

## 2020-06-09 PROCEDURE — 93306 TTE W/DOPPLER COMPLETE: CPT

## 2020-06-09 RX ORDER — LEVALBUTEROL INHALATION SOLUTION 0.63 MG/3ML
0.63 SOLUTION RESPIRATORY (INHALATION) EVERY 6 HOURS
Status: DISCONTINUED | OUTPATIENT
Start: 2020-06-09 | End: 2020-06-10 | Stop reason: HOSPADM

## 2020-06-09 RX ORDER — LIDOCAINE HYDROCHLORIDE 10 MG/ML
INJECTION, SOLUTION EPIDURAL; INFILTRATION; INTRACAUDAL; PERINEURAL AS NEEDED
Status: DISCONTINUED | OUTPATIENT
Start: 2020-06-09 | End: 2020-06-09 | Stop reason: SURG

## 2020-06-09 RX ORDER — GUAIFENESIN/DEXTROMETHORPHAN 100-10MG/5
10 SYRUP ORAL EVERY 4 HOURS PRN
Status: DISCONTINUED | OUTPATIENT
Start: 2020-06-09 | End: 2020-06-10 | Stop reason: HOSPADM

## 2020-06-09 RX ORDER — SODIUM CHLORIDE 9 MG/ML
100 INJECTION, SOLUTION INTRAVENOUS CONTINUOUS
Status: DISCONTINUED | OUTPATIENT
Start: 2020-06-09 | End: 2020-06-10 | Stop reason: HOSPADM

## 2020-06-09 RX ORDER — PROPOFOL 10 MG/ML
INJECTION, EMULSION INTRAVENOUS AS NEEDED
Status: DISCONTINUED | OUTPATIENT
Start: 2020-06-09 | End: 2020-06-09 | Stop reason: SURG

## 2020-06-09 RX ORDER — DEXAMETHASONE SODIUM PHOSPHATE 4 MG/ML
INJECTION, SOLUTION INTRA-ARTICULAR; INTRALESIONAL; INTRAMUSCULAR; INTRAVENOUS; SOFT TISSUE AS NEEDED
Status: DISCONTINUED | OUTPATIENT
Start: 2020-06-09 | End: 2020-06-09 | Stop reason: SURG

## 2020-06-09 RX ORDER — EPHEDRINE SULFATE 50 MG/ML
INJECTION INTRAVENOUS AS NEEDED
Status: DISCONTINUED | OUTPATIENT
Start: 2020-06-09 | End: 2020-06-09 | Stop reason: SURG

## 2020-06-09 RX ADMIN — Medication 1 SPRAY: at 23:27

## 2020-06-09 RX ADMIN — ACETAMINOPHEN 650 MG: 325 TABLET ORAL at 06:21

## 2020-06-09 RX ADMIN — LIDOCAINE HYDROCHLORIDE 100 MG: 10 INJECTION, SOLUTION EPIDURAL; INFILTRATION; INTRACAUDAL; PERINEURAL at 16:02

## 2020-06-09 RX ADMIN — PHENYLEPHRINE HYDROCHLORIDE 100 MCG: 10 INJECTION INTRAVENOUS at 16:11

## 2020-06-09 RX ADMIN — LOSARTAN POTASSIUM 25 MG: 25 TABLET, FILM COATED ORAL at 08:29

## 2020-06-09 RX ADMIN — PHENYLEPHRINE HYDROCHLORIDE 100 MCG: 10 INJECTION INTRAVENOUS at 16:42

## 2020-06-09 RX ADMIN — PHENYLEPHRINE HYDROCHLORIDE 100 MCG: 10 INJECTION INTRAVENOUS at 16:27

## 2020-06-09 RX ADMIN — PHENYLEPHRINE HYDROCHLORIDE 100 MCG: 10 INJECTION INTRAVENOUS at 16:25

## 2020-06-09 RX ADMIN — PHENYLEPHRINE HYDROCHLORIDE 100 MCG: 10 INJECTION INTRAVENOUS at 16:32

## 2020-06-09 RX ADMIN — PHENYLEPHRINE HYDROCHLORIDE 100 MCG: 10 INJECTION INTRAVENOUS at 17:16

## 2020-06-09 RX ADMIN — ACETAMINOPHEN 650 MG: 325 TABLET ORAL at 18:22

## 2020-06-09 RX ADMIN — PHENYLEPHRINE HYDROCHLORIDE 100 MCG: 10 INJECTION INTRAVENOUS at 16:50

## 2020-06-09 RX ADMIN — SODIUM CHLORIDE 100 ML/HR: 0.9 INJECTION, SOLUTION INTRAVENOUS at 14:19

## 2020-06-09 RX ADMIN — PROPOFOL 120 MG: 10 INJECTION, EMULSION INTRAVENOUS at 16:02

## 2020-06-09 RX ADMIN — PHENYLEPHRINE HYDROCHLORIDE 100 MCG: 10 INJECTION INTRAVENOUS at 16:38

## 2020-06-09 RX ADMIN — PROPOFOL 50 MG: 10 INJECTION, EMULSION INTRAVENOUS at 16:55

## 2020-06-09 RX ADMIN — PHENYLEPHRINE HYDROCHLORIDE 100 MCG: 10 INJECTION INTRAVENOUS at 16:54

## 2020-06-09 RX ADMIN — PHENYLEPHRINE HYDROCHLORIDE 100 MCG: 10 INJECTION INTRAVENOUS at 17:09

## 2020-06-09 RX ADMIN — LEVALBUTEROL HYDROCHLORIDE 0.63 MG: 0.63 SOLUTION RESPIRATORY (INHALATION) at 19:05

## 2020-06-09 RX ADMIN — LIDOCAINE 2 PATCH: 50 PATCH TOPICAL at 08:29

## 2020-06-09 RX ADMIN — ALLOPURINOL 100 MG: 100 TABLET ORAL at 08:29

## 2020-06-09 RX ADMIN — EPHEDRINE SULFATE 10 MG: 50 INJECTION, SOLUTION INTRAVENOUS at 17:20

## 2020-06-09 RX ADMIN — FUROSEMIDE 20 MG: 20 TABLET ORAL at 08:29

## 2020-06-09 RX ADMIN — PHENYLEPHRINE HYDROCHLORIDE 150 MCG: 10 INJECTION INTRAVENOUS at 17:06

## 2020-06-09 RX ADMIN — DEXAMETHASONE SODIUM PHOSPHATE 8 MG: 4 INJECTION, SOLUTION INTRAMUSCULAR; INTRAVENOUS at 17:22

## 2020-06-09 RX ADMIN — PROPOFOL 100 MG: 10 INJECTION, EMULSION INTRAVENOUS at 16:05

## 2020-06-10 VITALS
RESPIRATION RATE: 18 BRPM | HEART RATE: 86 BPM | DIASTOLIC BLOOD PRESSURE: 69 MMHG | SYSTOLIC BLOOD PRESSURE: 117 MMHG | OXYGEN SATURATION: 96 % | TEMPERATURE: 97.4 F

## 2020-06-10 LAB — P JIROVECII AG SPEC QL IF: NORMAL

## 2020-06-10 PROCEDURE — 94760 N-INVAS EAR/PLS OXIMETRY 1: CPT

## 2020-06-10 PROCEDURE — 99239 HOSP IP/OBS DSCHRG MGMT >30: CPT | Performed by: INTERNAL MEDICINE

## 2020-06-10 PROCEDURE — 94640 AIRWAY INHALATION TREATMENT: CPT

## 2020-06-10 PROCEDURE — 99222 1ST HOSP IP/OBS MODERATE 55: CPT | Performed by: INTERNAL MEDICINE

## 2020-06-10 PROCEDURE — 99232 SBSQ HOSP IP/OBS MODERATE 35: CPT | Performed by: INTERNAL MEDICINE

## 2020-06-10 RX ADMIN — LOSARTAN POTASSIUM 25 MG: 25 TABLET, FILM COATED ORAL at 08:17

## 2020-06-10 RX ADMIN — ALLOPURINOL 100 MG: 100 TABLET ORAL at 08:17

## 2020-06-10 RX ADMIN — ENOXAPARIN SODIUM 40 MG: 40 INJECTION SUBCUTANEOUS at 08:19

## 2020-06-10 RX ADMIN — ACETAMINOPHEN 650 MG: 325 TABLET ORAL at 08:17

## 2020-06-10 RX ADMIN — LIDOCAINE 2 PATCH: 50 PATCH TOPICAL at 08:18

## 2020-06-10 RX ADMIN — PANTOPRAZOLE SODIUM 40 MG: 40 TABLET, DELAYED RELEASE ORAL at 06:25

## 2020-06-10 RX ADMIN — FUROSEMIDE 20 MG: 20 TABLET ORAL at 08:17

## 2020-06-10 RX ADMIN — LEVALBUTEROL HYDROCHLORIDE 0.63 MG: 0.63 SOLUTION RESPIRATORY (INHALATION) at 01:36

## 2020-06-10 RX ADMIN — LEVALBUTEROL HYDROCHLORIDE 0.63 MG: 0.63 SOLUTION RESPIRATORY (INHALATION) at 07:16

## 2020-06-11 ENCOUNTER — TELEMEDICINE (OUTPATIENT)
Dept: FAMILY MEDICINE CLINIC | Facility: CLINIC | Age: 85
End: 2020-06-11
Payer: MEDICARE

## 2020-06-11 ENCOUNTER — TRANSITIONAL CARE MANAGEMENT (OUTPATIENT)
Dept: FAMILY MEDICINE CLINIC | Facility: CLINIC | Age: 85
End: 2020-06-11

## 2020-06-11 DIAGNOSIS — J98.11 ATELECTASIS OF LEFT LUNG: Primary | ICD-10-CM

## 2020-06-11 LAB
BACTERIA BRONCH AEROBE CULT: NORMAL
GRAM STN SPEC: NORMAL

## 2020-06-11 PROCEDURE — 99495 TRANSJ CARE MGMT MOD F2F 14D: CPT | Performed by: FAMILY MEDICINE

## 2020-06-12 ENCOUNTER — TELEPHONE (OUTPATIENT)
Dept: FAMILY MEDICINE CLINIC | Facility: CLINIC | Age: 85
End: 2020-06-12

## 2020-06-15 PROBLEM — I48.91 UNSPECIFIED ATRIAL FIBRILLATION (HCC): Status: ACTIVE | Noted: 2020-06-15

## 2020-06-21 PROBLEM — J20.9 ACUTE BRONCHITIS: Status: RESOLVED | Noted: 2020-02-02 | Resolved: 2020-06-21

## 2020-07-01 DIAGNOSIS — I50.21 ACUTE SYSTOLIC CONGESTIVE HEART FAILURE (HCC): ICD-10-CM

## 2020-07-06 RX ORDER — FUROSEMIDE 20 MG/1
TABLET ORAL
Qty: 90 TABLET | Refills: 3 | OUTPATIENT
Start: 2020-07-06

## 2020-07-13 LAB — FUNGUS SPEC CULT: NORMAL

## 2020-07-28 LAB
MYCOBACTERIUM SPEC CULT: NORMAL
RHODAMINE-AURAMINE STN SPEC: NORMAL

## 2020-08-05 DIAGNOSIS — I50.21 ACUTE SYSTOLIC CONGESTIVE HEART FAILURE (HCC): ICD-10-CM

## 2020-08-13 RX ORDER — FUROSEMIDE 20 MG/1
TABLET ORAL
Qty: 90 TABLET | OUTPATIENT
Start: 2020-08-13

## 2020-08-25 ENCOUNTER — TELEMEDICINE (OUTPATIENT)
Dept: FAMILY MEDICINE CLINIC | Facility: CLINIC | Age: 85
End: 2020-08-25
Payer: MEDICARE

## 2020-08-25 DIAGNOSIS — R11.0 NAUSEA: Primary | ICD-10-CM

## 2020-08-25 DIAGNOSIS — I48.0 PAROXYSMAL ATRIAL FIBRILLATION (HCC): ICD-10-CM

## 2020-08-25 DIAGNOSIS — E11.9 TYPE 2 DIABETES MELLITUS WITHOUT COMPLICATION, WITHOUT LONG-TERM CURRENT USE OF INSULIN (HCC): ICD-10-CM

## 2020-08-25 PROCEDURE — 3044F HG A1C LEVEL LT 7.0%: CPT | Performed by: FAMILY MEDICINE

## 2020-08-25 PROCEDURE — 3074F SYST BP LT 130 MM HG: CPT | Performed by: FAMILY MEDICINE

## 2020-08-25 PROCEDURE — 3066F NEPHROPATHY DOC TX: CPT | Performed by: FAMILY MEDICINE

## 2020-08-25 PROCEDURE — 4040F PNEUMOC VAC/ADMIN/RCVD: CPT | Performed by: FAMILY MEDICINE

## 2020-08-25 PROCEDURE — 3078F DIAST BP <80 MM HG: CPT | Performed by: FAMILY MEDICINE

## 2020-08-25 PROCEDURE — 1036F TOBACCO NON-USER: CPT | Performed by: FAMILY MEDICINE

## 2020-08-25 PROCEDURE — 99214 OFFICE O/P EST MOD 30 MIN: CPT | Performed by: FAMILY MEDICINE

## 2020-08-25 RX ORDER — ONDANSETRON 4 MG/1
4 TABLET, FILM COATED ORAL EVERY 8 HOURS PRN
Qty: 20 TABLET | Refills: 0 | Status: SHIPPED | OUTPATIENT
Start: 2020-08-25 | End: 2020-09-09 | Stop reason: ALTCHOICE

## 2020-08-25 NOTE — ASSESSMENT & PLAN NOTE
Lab Results   Component Value Date    HGBA1C 6 1 06/05/2020   not having hyperglycemia, or any sxs related to diabetes  She is off of meds

## 2020-09-09 DIAGNOSIS — K64.2 GRADE III HEMORRHOIDS: Primary | ICD-10-CM

## 2020-09-09 RX ORDER — LIDOCAINE 50 MG/G
OINTMENT TOPICAL
Qty: 85.05 G | Refills: 0 | Status: SHIPPED | OUTPATIENT
Start: 2020-09-09

## 2020-09-09 NOTE — PROGRESS NOTES
I visited patient today, having hemorrhoids as main complaint  Painful and burning  To use lidocaine as needed  Sent 85 gr to her pharmacy  Pt is in hospice care

## 2022-07-19 NOTE — TELEPHONE ENCOUNTER
Spoke to patient in regards to lab results      ----- Message from Chapin Chavez MD sent at 11/21/2018  9:16 AM EST -----  Please call patient regarding anemia, to check iron level to replace  No major procedures will attempt due to her age 
Universal Safety Interventions

## 2024-11-17 NOTE — ADDENDUM NOTE
Addended by: Mario Kearns on: 2/25/2019 12:55 PM     Modules accepted: Josie Emilia Alan is a 84 y.o. female admitted to  for chest pain. Tn slightly elevated but normalized. EKG w/o acute ischemic changes. ECHO w/o acute WMAs. NM stress test ordered for 11/18. New perfusion abnormality noted on NM test. IC consulted. LHC completed which showed a moderate coronary artery disease is present. 50% Ostial ramus lesion. Will continue ASA and high intensity statin at discharge. Patient seen before/ after procedure and is medically ready. All questions answered at discharge with verbal understanding. Return precautions given. Cardiology referral placed.     Physical Exam  Gen: in NAD, appears stated age  CVS: RRR, no m/r/g; S1/S2 auscultated with no S3 or S4; capillary refill < 2 sec  Resp: lungs CTAB, no w/r/r; no belabored breathing or accessory muscle use appreciated